# Patient Record
Sex: MALE | Race: WHITE | Employment: FULL TIME | URBAN - METROPOLITAN AREA
[De-identification: names, ages, dates, MRNs, and addresses within clinical notes are randomized per-mention and may not be internally consistent; named-entity substitution may affect disease eponyms.]

---

## 2017-02-08 ENCOUNTER — TELEPHONE (OUTPATIENT)
Dept: FAMILY MEDICINE CLINIC | Facility: CLINIC | Age: 57
End: 2017-02-08

## 2017-02-08 DIAGNOSIS — Z13.220 LIPID SCREENING: Primary | ICD-10-CM

## 2017-02-08 NOTE — TELEPHONE ENCOUNTER
Pt made physical appt and needs blood work order sent to [facility].

## 2017-02-15 ENCOUNTER — APPOINTMENT (OUTPATIENT)
Dept: LAB | Age: 57
End: 2017-02-15
Attending: FAMILY MEDICINE
Payer: COMMERCIAL

## 2017-02-15 DIAGNOSIS — Z13.220 LIPID SCREENING: ICD-10-CM

## 2017-02-15 LAB
ALBUMIN SERPL-MCNC: 4 G/DL (ref 3.5–4.8)
ALP LIVER SERPL-CCNC: 60 U/L (ref 45–117)
ALT SERPL-CCNC: 29 U/L (ref 17–63)
AST SERPL-CCNC: 12 U/L (ref 15–41)
BILIRUB SERPL-MCNC: 0.7 MG/DL (ref 0.1–2)
BUN BLD-MCNC: 19 MG/DL (ref 8–20)
CALCIUM BLD-MCNC: 9.1 MG/DL (ref 8.3–10.3)
CHLORIDE: 106 MMOL/L (ref 101–111)
CHOLEST SMN-MCNC: 211 MG/DL (ref ?–200)
CO2: 30 MMOL/L (ref 22–32)
CREAT BLD-MCNC: 1.04 MG/DL (ref 0.7–1.3)
GLUCOSE BLD-MCNC: 92 MG/DL (ref 70–99)
HDLC SERPL-MCNC: 59 MG/DL (ref 45–?)
HDLC SERPL: 3.58 {RATIO} (ref ?–4.97)
LDLC SERPL CALC-MCNC: 136 MG/DL (ref ?–130)
M PROTEIN MFR SERPL ELPH: 7.2 G/DL (ref 6.1–8.3)
NONHDLC SERPL-MCNC: 152 MG/DL (ref ?–130)
POTASSIUM SERPL-SCNC: 4 MMOL/L (ref 3.6–5.1)
SODIUM SERPL-SCNC: 141 MMOL/L (ref 136–144)
TRIGLYCERIDES: 80 MG/DL (ref ?–150)
VLDL: 16 MG/DL (ref 5–40)

## 2017-02-15 PROCEDURE — 80053 COMPREHEN METABOLIC PANEL: CPT

## 2017-02-15 PROCEDURE — 36415 COLL VENOUS BLD VENIPUNCTURE: CPT

## 2017-02-15 PROCEDURE — 80061 LIPID PANEL: CPT

## 2017-02-21 ENCOUNTER — OFFICE VISIT (OUTPATIENT)
Dept: FAMILY MEDICINE CLINIC | Facility: CLINIC | Age: 57
End: 2017-02-21

## 2017-02-21 VITALS
HEIGHT: 68 IN | RESPIRATION RATE: 12 BRPM | DIASTOLIC BLOOD PRESSURE: 70 MMHG | TEMPERATURE: 98 F | WEIGHT: 185 LBS | BODY MASS INDEX: 28.04 KG/M2 | SYSTOLIC BLOOD PRESSURE: 104 MMHG | HEART RATE: 68 BPM

## 2017-02-21 DIAGNOSIS — Z00.00 ANNUAL PHYSICAL EXAM: Primary | ICD-10-CM

## 2017-02-21 PROCEDURE — 99396 PREV VISIT EST AGE 40-64: CPT | Performed by: FAMILY MEDICINE

## 2017-02-21 NOTE — PROGRESS NOTES
Patient presents with:  Physical     HPI:   Messi Miranda is a 55year old male who presents for a complete physical exam. No specific concerns. Last colonoscopy:  1/25/2013. Due again 10 yrs. We have this record in scan.    Last PSA:  No famil Exercise: at [address] in the AMs. Elliptical or treadmill. , weights. 3-4 times a week. Diet: \"pretty good\". REVIEW OF SYSTEMS:     All systems reviewed, negative other than noted above.     EXAM:   /70 mmHg  Pulse 68  Temp(Src) 98.1 °

## 2020-02-27 LAB
AMB EXT CHOL/HDL RATIO: 3.9
AMB EXT CHOLESTEROL, TOTAL: 202 MG/DL
AMB EXT GLUCOSE: 100 MG/DL
AMB EXT HDL CHOLESTEROL: 52 MG/DL
AMB EXT LDL CHOLESTEROL, DIRECT: 134 MG/DL
AMB EXT TRIGLYCERIDES: 77 MG/DL

## 2020-06-24 DIAGNOSIS — Z78.9 PARTICIPANT IN HEALTH AND WELLNESS PLAN: Primary | ICD-10-CM

## 2020-07-07 DIAGNOSIS — Z78.9 PARTICIPANT IN HEALTH AND WELLNESS PLAN: Primary | ICD-10-CM

## 2020-07-14 ENCOUNTER — NURSE ONLY (OUTPATIENT)
Dept: LAB | Age: 60
End: 2020-07-14
Attending: PREVENTIVE MEDICINE

## 2020-07-14 DIAGNOSIS — Z78.9 PARTICIPANT IN HEALTH AND WELLNESS PLAN: ICD-10-CM

## 2020-07-14 LAB — SARS-COV-2 IGG SERPLBLD QL IA.RAPID: NEGATIVE

## 2020-07-14 PROCEDURE — 86769 SARS-COV-2 COVID-19 ANTIBODY: CPT

## 2020-07-23 ENCOUNTER — ORDER TRANSCRIPTION (OUTPATIENT)
Dept: PHYSICAL THERAPY | Age: 60
End: 2020-07-23

## 2020-07-23 DIAGNOSIS — S89.91XA INJURY OF RIGHT KNEE, INITIAL ENCOUNTER: ICD-10-CM

## 2020-07-23 DIAGNOSIS — S83.411A SPRAIN OF MEDIAL COLLATERAL LIGAMENT OF RIGHT KNEE: Primary | ICD-10-CM

## 2020-07-24 ENCOUNTER — TELEPHONE (OUTPATIENT)
Dept: FAMILY MEDICINE CLINIC | Facility: CLINIC | Age: 60
End: 2020-07-24

## 2020-07-24 DIAGNOSIS — Z00.00 LABORATORY EXAM ORDERED AS PART OF ROUTINE GENERAL MEDICAL EXAMINATION: Primary | ICD-10-CM

## 2020-07-24 NOTE — TELEPHONE ENCOUNTER
Please enter lab orders for the patient's upcoming physical appointment. Physical scheduled:    Your appointments     Date & Time Appointment Department [facility])    Sep 16, 2020  7:30 AM CDT Adult Physical with Alejandra Barcenas  [facility],

## 2020-07-28 ENCOUNTER — OFFICE VISIT (OUTPATIENT)
Dept: PHYSICAL THERAPY | Age: 60
End: 2020-07-28
Attending: ORTHOPAEDIC SURGERY
Payer: COMMERCIAL

## 2020-07-28 DIAGNOSIS — S83.411A SPRAIN OF MEDIAL COLLATERAL LIGAMENT OF RIGHT KNEE: ICD-10-CM

## 2020-07-28 DIAGNOSIS — S89.91XA INJURY OF RIGHT KNEE, INITIAL ENCOUNTER: ICD-10-CM

## 2020-07-28 PROCEDURE — 97161 PT EVAL LOW COMPLEX 20 MIN: CPT | Performed by: PHYSICAL THERAPIST

## 2020-07-28 PROCEDURE — 97110 THERAPEUTIC EXERCISES: CPT | Performed by: PHYSICAL THERAPIST

## 2020-07-28 NOTE — PROGRESS NOTES
KNEE EVALUATION:    Referring Physician: Erich Vigil MD    DX Code: Sprain of medial collateral ligament of right knee (I13.887G)  Injury of right knee, initial encounter (S89.91XA)     PT DX: Sprain of medial collateral ligament of right knee (S83.41 Handouts given  Pt.  Education: Activity modification: start HEP 2-6x/day  (Please close note by pressing F9 and then reopen by clicking on 'Edit'  before going further)   ASSESSMENT & PLAN OF CARE:     Jerre Krabbe is a 63year old male referred to weeks or a total of 8 visits.    Treatment will include:  · Manual therapy to address joint and/or soft tissue mobility  · Therapeutic exercises  · Stretching program  · Pt. education for posture, body mechanics, and ergonomics  · Balance and proprioceptive

## 2020-07-28 NOTE — PROGRESS NOTES
KNEE EVALUATION:    Referring Physician: Shawn Benítez MD    DX Code: Sprain of medial collateral ligament of right knee (U18.707W)  Injury of right knee, initial encounter (S89.91XA)     PT DX: Sprain of medial collateral ligament of right knee (S83.41 Handouts given  Pt.  Education: Activity modification: start HEP 2-6x/day  (Please close note by pressing F9 and then reopen by clicking on 'Edit'  before going further)   ASSESSMENT & PLAN OF CARE:     Barak Arzate is a 63year old male referred to weeks or a total of 8 visits.    Treatment will include:  · Manual therapy to address joint and/or soft tissue mobility  · Therapeutic exercises  · Stretching program  · Pt. education for posture, body mechanics, and ergonomics  · Balance and proprioceptive

## 2020-07-30 ENCOUNTER — OFFICE VISIT (OUTPATIENT)
Dept: PHYSICAL THERAPY | Age: 60
End: 2020-07-30
Attending: ORTHOPAEDIC SURGERY
Payer: COMMERCIAL

## 2020-07-30 PROCEDURE — 97110 THERAPEUTIC EXERCISES: CPT | Performed by: PHYSICAL THERAPIST

## 2020-07-30 NOTE — PROGRESS NOTES
Dx: Sprain of medial collateral ligament of right knee (H44.443G)  Injury of right knee, initial encounter (X33.17UD)         Authorized # of Visits:  8         Next MD visit: none scheduled  Fall Risk: standard         Precautions: n/a           Medicatio

## 2020-08-10 ENCOUNTER — APPOINTMENT (OUTPATIENT)
Dept: PHYSICAL THERAPY | Age: 60
End: 2020-08-10
Payer: COMMERCIAL

## 2020-08-11 ENCOUNTER — APPOINTMENT (OUTPATIENT)
Dept: PHYSICAL THERAPY | Age: 60
End: 2020-08-11
Attending: ORTHOPAEDIC SURGERY
Payer: COMMERCIAL

## 2020-08-11 ENCOUNTER — TELEPHONE (OUTPATIENT)
Dept: PHYSICAL THERAPY | Age: 60
End: 2020-08-11

## 2020-08-14 ENCOUNTER — OFFICE VISIT (OUTPATIENT)
Dept: PHYSICAL THERAPY | Age: 60
End: 2020-08-14
Attending: ORTHOPAEDIC SURGERY
Payer: COMMERCIAL

## 2020-08-14 PROCEDURE — 97110 THERAPEUTIC EXERCISES: CPT | Performed by: PHYSICAL THERAPIST

## 2020-08-14 NOTE — PROGRESS NOTES
Dx: Sprain of medial collateral ligament of right knee (P78.743T)  Injury of right knee, initial encounter (D11.05DT)         Authorized # of Visits:  8         Next MD visit: none scheduled  Fall Risk: standard         Precautions: n/a           Medicatio to cont with HEP and self coordination ex, return Monday for follow up. Possible discharge end of next week if doing well.     Skilled Services: TE, MT    Charges: TE3       Total Timed Treatment: 44 min  Total Treatment Time: 45 min

## 2020-08-17 ENCOUNTER — OFFICE VISIT (OUTPATIENT)
Dept: PHYSICAL THERAPY | Age: 60
End: 2020-08-17
Attending: ORTHOPAEDIC SURGERY
Payer: COMMERCIAL

## 2020-08-17 PROCEDURE — 97110 THERAPEUTIC EXERCISES: CPT | Performed by: PHYSICAL THERAPIST

## 2020-08-17 NOTE — PROGRESS NOTES
Dx: Sprain of medial collateral ligament of right knee (O99.781H)  Injury of right knee, initial encounter (W36.97DI)         Authorized # of Visits:  8         Next MD visit: none scheduled  Fall Risk: standard         Precautions: n/a           Medicatio Sartorius and left hip ext to 5/5 to allow pt to return to water skiing. · LEISURE:  Pt will be able to return to previous level of function for leisure activities  Pt. will be able to perform ADLs with no pain.     Plan: progress to full function, pt to c

## 2020-08-19 ENCOUNTER — OFFICE VISIT (OUTPATIENT)
Dept: PHYSICAL THERAPY | Age: 60
End: 2020-08-19
Attending: ORTHOPAEDIC SURGERY
Payer: COMMERCIAL

## 2020-08-19 PROCEDURE — 97110 THERAPEUTIC EXERCISES: CPT | Performed by: PHYSICAL THERAPIST

## 2020-08-19 NOTE — PROGRESS NOTES
DISCHARGE NOTE    Dx: Sprain of medial collateral ligament of right knee (O48.780V)  Injury of right knee, initial encounter (L81.54ML)         Authorized # of Visits:  8         Next MD visit: none scheduled  Fall Risk: standard         Precautions: n/a bird-dog x 10 ea 4 point bird-dog x 10 ea 4 point bird-dog x 10 ea          Assessment: progressing well. Unable to provoke any symptoms with full squats or descending stairs. Goals:     to be reached in 8 visits.   · Pt. will report decreased pain fro

## 2020-08-31 ENCOUNTER — APPOINTMENT (OUTPATIENT)
Dept: PHYSICAL THERAPY | Age: 60
End: 2020-08-31
Attending: ORTHOPAEDIC SURGERY
Payer: COMMERCIAL

## 2020-09-02 ENCOUNTER — APPOINTMENT (OUTPATIENT)
Dept: PHYSICAL THERAPY | Age: 60
End: 2020-09-02
Attending: ORTHOPAEDIC SURGERY

## 2020-09-16 ENCOUNTER — OFFICE VISIT (OUTPATIENT)
Dept: FAMILY MEDICINE CLINIC | Facility: CLINIC | Age: 60
End: 2020-09-16
Payer: COMMERCIAL

## 2020-09-16 VITALS
RESPIRATION RATE: 16 BRPM | SYSTOLIC BLOOD PRESSURE: 114 MMHG | HEART RATE: 72 BPM | DIASTOLIC BLOOD PRESSURE: 70 MMHG | BODY MASS INDEX: 28.85 KG/M2 | TEMPERATURE: 97 F | WEIGHT: 183.81 LBS | HEIGHT: 67 IN

## 2020-09-16 DIAGNOSIS — R23.9 SKIN CHANGE: ICD-10-CM

## 2020-09-16 DIAGNOSIS — Z00.00 ANNUAL PHYSICAL EXAM: Primary | ICD-10-CM

## 2020-09-16 PROCEDURE — 3008F BODY MASS INDEX DOCD: CPT | Performed by: FAMILY MEDICINE

## 2020-09-16 PROCEDURE — 3078F DIAST BP <80 MM HG: CPT | Performed by: FAMILY MEDICINE

## 2020-09-16 PROCEDURE — 3074F SYST BP LT 130 MM HG: CPT | Performed by: FAMILY MEDICINE

## 2020-09-16 PROCEDURE — 99386 PREV VISIT NEW AGE 40-64: CPT | Performed by: FAMILY MEDICINE

## 2020-09-16 NOTE — PROGRESS NOTES
Patient presents with: Well Adult: Annual physical     HPI:   Leonardo Bradshaw is a 62year old male who presents for a complete physical exam.  Feeling well overall. Last colonoscopy:  1/2013. Normal.  Repeat 2013.    Last PSA:   No issues, normal History    Tobacco Use      Smoking status: Never Smoker      Smokeless tobacco: Never Used    Alcohol use:  Yes      Alcohol/week: 0.0 standard drinks      Frequency: 2-4 times a month      Drinks per session: 1 or 2      Comment: occasional    Drug use: N

## 2020-09-18 ENCOUNTER — PATIENT MESSAGE (OUTPATIENT)
Dept: FAMILY MEDICINE CLINIC | Facility: CLINIC | Age: 60
End: 2020-09-18

## 2020-09-18 NOTE — TELEPHONE ENCOUNTER
From: Solitario Romero  To: Jordan Cameron MD  Sent: 9/18/2020 8:00 AM CDT  Subject: Test Results Question    Here is the blood work that was done in February. Is this what you need or do I need to go again?

## 2020-09-29 ENCOUNTER — LAB REQUISITION (OUTPATIENT)
Dept: LAB | Facility: HOSPITAL | Age: 60
End: 2020-09-29
Payer: COMMERCIAL

## 2020-09-29 DIAGNOSIS — D48.5 NEOPLASM OF UNCERTAIN BEHAVIOR OF SKIN: ICD-10-CM

## 2020-09-29 DIAGNOSIS — C44.91 BASAL CELL CARCINOMA OF SKIN, UNSPECIFIED: ICD-10-CM

## 2020-09-29 PROCEDURE — 88305 TISSUE EXAM BY PATHOLOGIST: CPT | Performed by: DERMATOLOGY

## 2020-12-19 ENCOUNTER — IMMUNIZATION (OUTPATIENT)
Dept: LAB | Facility: HOSPITAL | Age: 60
End: 2020-12-19
Attending: PREVENTIVE MEDICINE
Payer: COMMERCIAL

## 2020-12-19 DIAGNOSIS — Z23 NEED FOR VACCINATION: ICD-10-CM

## 2020-12-19 PROCEDURE — 0001A PFIZER-BIONTECH COVID-19 VACCINE: CPT

## 2020-12-21 ENCOUNTER — LAB ENCOUNTER (OUTPATIENT)
Dept: LAB | Facility: HOSPITAL | Age: 60
End: 2020-12-21
Attending: PREVENTIVE MEDICINE

## 2020-12-21 ENCOUNTER — TELEPHONE (OUTPATIENT)
Dept: INTERNAL MEDICINE CLINIC | Facility: HOSPITAL | Age: 60
End: 2020-12-21

## 2020-12-21 DIAGNOSIS — Z20.822 EXPOSURE TO COVID-19 VIRUS: ICD-10-CM

## 2020-12-21 DIAGNOSIS — Z20.822 EXPOSURE TO COVID-19 VIRUS: Primary | ICD-10-CM

## 2020-12-21 NOTE — TELEPHONE ENCOUNTER
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 [] Yes    [x] No   If yes, explain:       NOTES: Dr. Ozzie Meckel requests m2000 test today or tomorrow.   Gisel Murray has been coordinating [facility]

## 2020-12-22 NOTE — TELEPHONE ENCOUNTER
Results and RTW guidelines:    COVID RESULT GIVEN:      Test type:    [] Rapid         [x]       [x] NEGATIVE     Ordered  retest?  []Yes   [x] No (skip to RTW)          Notes:   Remains asymptomatic    RTW PLAN:    [] RTW 10 days with clearanc

## 2021-01-09 ENCOUNTER — IMMUNIZATION (OUTPATIENT)
Dept: LAB | Facility: HOSPITAL | Age: 61
End: 2021-01-09
Attending: PREVENTIVE MEDICINE
Payer: COMMERCIAL

## 2021-01-09 DIAGNOSIS — Z23 NEED FOR VACCINATION: ICD-10-CM

## 2021-01-09 PROCEDURE — 0002A SARSCOV2 VAC 30MCG/0.3ML IM: CPT

## 2021-07-14 ENCOUNTER — TELEPHONE (OUTPATIENT)
Dept: ORTHOPEDICS CLINIC | Facility: CLINIC | Age: 61
End: 2021-07-14

## 2021-07-14 DIAGNOSIS — M25.562 LEFT KNEE PAIN, UNSPECIFIED CHRONICITY: Primary | ICD-10-CM

## 2021-07-14 NOTE — TELEPHONE ENCOUNTER
Attempted to call Julissa Bennett regarding xray orders. Reached voicemail, left voicemail and provided a detail message with my call back contact information.

## 2021-07-14 NOTE — TELEPHONE ENCOUNTER
Patient has an appointment scheduled on 7/15 for left knee injury. Will patient need imaging prior to appointment?

## 2021-07-15 ENCOUNTER — HOSPITAL ENCOUNTER (OUTPATIENT)
Dept: GENERAL RADIOLOGY | Age: 61
Discharge: HOME | End: 2021-07-15
Attending: NURSE PRACTITIONER
Payer: COMMERCIAL

## 2021-07-15 ENCOUNTER — OFFICE VISIT (OUTPATIENT)
Dept: ORTHOPEDICS CLINIC | Facility: CLINIC | Age: 61
End: 2021-07-15
Payer: COMMERCIAL

## 2021-07-15 DIAGNOSIS — Z01.89 ENCOUNTER FOR LOWER EXTREMITY COMPARISON IMAGING STUDY: ICD-10-CM

## 2021-07-15 DIAGNOSIS — M22.42 CHONDROMALACIA, PATELLA, LEFT: Primary | ICD-10-CM

## 2021-07-15 DIAGNOSIS — M25.562 LEFT KNEE PAIN, UNSPECIFIED CHRONICITY: ICD-10-CM

## 2021-07-15 DIAGNOSIS — M25.562 ACUTE PAIN OF LEFT KNEE: ICD-10-CM

## 2021-07-15 PROCEDURE — 73564 X-RAY EXAM KNEE 4 OR MORE: CPT | Performed by: NURSE PRACTITIONER

## 2021-07-15 PROCEDURE — 99203 OFFICE O/P NEW LOW 30 MIN: CPT | Performed by: NURSE PRACTITIONER

## 2021-07-15 PROCEDURE — 73562 X-RAY EXAM OF KNEE 3: CPT | Performed by: NURSE PRACTITIONER

## 2021-07-15 RX ORDER — DICLOFENAC SODIUM 75 MG/1
75 TABLET, DELAYED RELEASE ORAL 2 TIMES DAILY
Qty: 60 TABLET | Refills: 1 | Status: SHIPPED | OUTPATIENT
Start: 2021-07-15 | End: 2021-08-09

## 2021-07-15 NOTE — PROGRESS NOTES
[facility] New Patient Note    CC: Patient presents with:  Knee Pain: left knee, onset: 07.04.21    HPI: This 62year old male presents today with complaints of acute onset left knee pain after spending a week at his summer home over the Fourth of [place] compartment but no true medial lateral joint line pain and a negative Jeremy's. He has no laxity valgus varus stress and a negative Lachman's. He is neurovascular intact distally.     IMAGING  Radiographs were personally reviewed that were negative for

## 2021-07-19 ENCOUNTER — TELEPHONE (OUTPATIENT)
Dept: FAMILY MEDICINE CLINIC | Facility: CLINIC | Age: 61
End: 2021-07-19

## 2021-07-19 DIAGNOSIS — Z12.5 PROSTATE CANCER SCREENING: ICD-10-CM

## 2021-07-19 DIAGNOSIS — Z13.220 LIPID SCREENING: Primary | ICD-10-CM

## 2021-07-19 NOTE — TELEPHONE ENCOUNTER
Please enter lab orders for the patient's upcoming physical appointment. Physical scheduled:    Your appointments     Date & Time Appointment Department [facility])    Sep 17, 2021  8:00 AM CDT Adult Physical with MD [facility] [employer],

## 2021-07-26 ENCOUNTER — TELEPHONE (OUTPATIENT)
Dept: ORTHOPEDICS CLINIC | Facility: CLINIC | Age: 61
End: 2021-07-26

## 2021-07-26 NOTE — TELEPHONE ENCOUNTER
Spoke to patient who states that he doesn't think that an injection is the best answer for his treatment, states that on Friday he heard a loud pop and could not bear weight on it.  He was out of town and went to [facility] and they did xrays which showed the same

## 2021-07-26 NOTE — TELEPHONE ENCOUNTER
Patient saw Christa Ames & had xrays on 7/15/21 for left knee pain. He had xrays again this weekend while he was out of town because the pain worsened. There were no changes in the xrays.  He was to begin PT this week but wants to know what his next steps are rega

## 2021-07-27 ENCOUNTER — TELEPHONE (OUTPATIENT)
Dept: PHYSICAL THERAPY | Facility: HOSPITAL | Age: 61
End: 2021-07-27

## 2021-07-27 ENCOUNTER — TELEPHONE (OUTPATIENT)
Dept: ORTHOPEDICS CLINIC | Facility: CLINIC | Age: 61
End: 2021-07-27

## 2021-07-27 DIAGNOSIS — M25.562 ACUTE PAIN OF LEFT KNEE: Primary | ICD-10-CM

## 2021-07-27 NOTE — TELEPHONE ENCOUNTER
Patient called again today asking if he could get an MRI order for his left knee that he re-injured over the weekend. He states his knee gives out and it has no support. He would like an MRI to see if there is a tear.

## 2021-07-27 NOTE — TELEPHONE ENCOUNTER
Mara Senior  7/27/21 11:33 AM  Patient called again today asking if he could get an MRI order for his left knee that he re-injured over the weekend. He states his knee gives out and it has no support. He would like an MRI to see if there is a tear.

## 2021-07-28 ENCOUNTER — OFFICE VISIT (OUTPATIENT)
Dept: PHYSICAL THERAPY | Facility: HOSPITAL | Age: 61
End: 2021-07-28
Attending: NURSE PRACTITIONER
Payer: COMMERCIAL

## 2021-07-28 DIAGNOSIS — M25.562 ACUTE PAIN OF LEFT KNEE: ICD-10-CM

## 2021-07-28 DIAGNOSIS — M22.42 CHONDROMALACIA, PATELLA, LEFT: ICD-10-CM

## 2021-07-28 PROCEDURE — 97110 THERAPEUTIC EXERCISES: CPT

## 2021-07-28 PROCEDURE — 97161 PT EVAL LOW COMPLEX 20 MIN: CPT

## 2021-07-28 NOTE — PROGRESS NOTES
LOWER EXTREMITY EVALUATION:   Referring Physician: Dr. 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  Past medical history was reviewed with Laura Negron. Significant findings include herniation of intervertebral disc between L4 and L5, with myelopathy (7/8/2013). He also mentions a history of R knee meniscus tear that improved with PT no surgery needed. pain anterior and posterior knee (Ely's)  Gastroc-soleus: R decreased; L decreased    Strength/MMT: (* denotes performed with pain)  Hip Knee   Flexion: R 5/5; L 4+/5  Extension: R 4+/5; L 4/5  ER: R 5/5; L 4+/5  IR: R 5/5; L 4+/5 Flexion: R 5/5; L 4/5  Ex knee AROM flexion to >140 degrees to improve ability to perform recreational activities including hiking and skiing   · Pt will improve quad strength to 5/5 to ascend 1 flight of stairs reciprocally without UE assist   · Pt will be independent and complian

## 2021-07-28 NOTE — TELEPHONE ENCOUNTER
Patient informed of MD recommendations below and MRI approval procedure, patient verbalized understanding with intent to comply. Offered opportunity to ask questions, all questions were answered.

## 2021-08-02 ENCOUNTER — HOSPITAL ENCOUNTER (OUTPATIENT)
Dept: MRI IMAGING | Facility: HOSPITAL | Age: 61
Discharge: HOME | End: 2021-08-02
Attending: ORTHOPAEDIC SURGERY
Payer: COMMERCIAL

## 2021-08-02 ENCOUNTER — TELEPHONE (OUTPATIENT)
Dept: ORTHOPEDICS CLINIC | Facility: CLINIC | Age: 61
End: 2021-08-02

## 2021-08-02 DIAGNOSIS — M25.562 ACUTE PAIN OF LEFT KNEE: ICD-10-CM

## 2021-08-02 PROCEDURE — 73721 MRI JNT OF LWR EXTRE W/O DYE: CPT | Performed by: ORTHOPAEDIC SURGERY

## 2021-08-02 NOTE — TELEPHONE ENCOUNTER
Pt called and notified the results for his MRI have not been finalized in his chart yet. Pt notified, once they are, they should be able to be viewed in his account. Pt notified to follow up in clinic for his MRI results. Pt verbalized understanding.  No fu

## 2021-08-05 ENCOUNTER — TELEPHONE (OUTPATIENT)
Dept: ORTHOPEDICS CLINIC | Facility: CLINIC | Age: 61
End: 2021-08-05

## 2021-08-05 ENCOUNTER — OFFICE VISIT (OUTPATIENT)
Dept: ORTHOPEDICS CLINIC | Facility: CLINIC | Age: 61
End: 2021-08-05
Payer: COMMERCIAL

## 2021-08-05 VITALS — HEIGHT: 68.5 IN | OXYGEN SATURATION: 99 % | BODY MASS INDEX: 27.3 KG/M2 | WEIGHT: 182.19 LBS | HEART RATE: 66 BPM

## 2021-08-05 DIAGNOSIS — M95.8 OSTEOCHONDRAL DEFECT: Primary | ICD-10-CM

## 2021-08-05 PROCEDURE — 99215 OFFICE O/P EST HI 40 MIN: CPT | Performed by: ORTHOPAEDIC SURGERY

## 2021-08-05 PROCEDURE — 3008F BODY MASS INDEX DOCD: CPT | Performed by: ORTHOPAEDIC SURGERY

## 2021-08-05 NOTE — TELEPHONE ENCOUNTER
Surgeon: Dr. Blu Ocampo     Date of Surgery: 8/10/21     Post Op Appt: 8/18/21     Facility: [facility]     IP vs OP: OP     Surgical Assistant Obtained: Chriss PRATER REQUESTED      Preadmission Testing Ordered: N/A      Pre-Op Clearance Requested:   PCP: N/A    Ca

## 2021-08-05 NOTE — PROGRESS NOTES
OR BOOKING SHEET KNEE ARTHROSCOPY  Name: Darrick Tomlinson  MRN: [id number]   : 1960  Diagnosis:  [x] Osteochondral defect [M95.8]  Disposition:    [x] Ambulatory  Operative Time Required: 1 hour  Procedure:  Antibiotics: 2 g cefazolin withi

## 2021-08-05 NOTE — H&P (VIEW-ONLY)
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  Jonathon urias neurovascular exam demonstrates normal perfusion, intact sensation to light touch and full strength. Examination of the contralateral knee demonstrates:  No significant atrophy, swelling or effusion. Full range of motion.  Neurovascularly intact distall proton density with and without fat saturation images were obtained. PATIENT STATED HISTORY: (As transcribed by Technologist)     FINDINGS:  LIGAMENTS:          The ACL, PCL, patellar retinacula, and collateral ligament complexes are intact.  MENISCI: displaced flap of articular cartilage within the intercondylar notch that measures approximately 1.7 x 1.2 cm in greatest AP and transverse dimensions respectively.  3. Mild joint effusion with small Baker's cyst and associated fluid distension of the popli the knee.      I had a lengthy discussion with Yaa Henning about the diagnosis and options, both surgical and nonsurgical. I have recommended that we proceed with surgical treatment as we agree surgical intervention would likely offer the best opportunity for s may still occur.

## 2021-08-05 NOTE — TELEPHONE ENCOUNTER
OR BOOKING SHEET KNEE ARTHROSCOPY  Name: Dakotah Adan  MRN: [id number]   : 1960  Diagnosis:  [x]? Osteochondral defect [M95.8]  Disposition:    [x]?  Ambulatory  Operative Time Required: 1 hour  Procedure:  Antibiotics: 2 g cefazolin withi

## 2021-08-05 NOTE — TELEPHONE ENCOUNTER
Shar Romero's case has been scheduled/rescheduled at 1125 on 8/10/2021 at [facility]  Received:  Daniella Artis RMA  Patient Name: Viral Hashimoto    MRN: [id number]     RIGHT KNEE Jacquelineestebanelo Lopez

## 2021-08-05 NOTE — H&P
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  Jonathon urias neurovascular exam demonstrates normal perfusion, intact sensation to light touch and full strength. Examination of the contralateral knee demonstrates:  No significant atrophy, swelling or effusion. Full range of motion.  Neurovascularly intact distall proton density with and without fat saturation images were obtained. PATIENT STATED HISTORY: (As transcribed by Technologist)     FINDINGS:  LIGAMENTS:          The ACL, PCL, patellar retinacula, and collateral ligament complexes are intact.  MENISCI: displaced flap of articular cartilage within the intercondylar notch that measures approximately 1.7 x 1.2 cm in greatest AP and transverse dimensions respectively.  3. Mild joint effusion with small Baker's cyst and associated fluid distension of the popli the knee.      I had a lengthy discussion with Chioma Stephens about the diagnosis and options, both surgical and nonsurgical. I have recommended that we proceed with surgical treatment as we agree surgical intervention would likely offer the best opportunity for s may still occur.

## 2021-08-05 NOTE — PROGRESS NOTES
[facility] Progress Note    Subjective: Previous patient of Silas Ramirez, here today to review MRI results. Patient reports that he had the onset of pain in his knee about 2-1/2 months ago without any inciting traumatic events.   He reports that he

## 2021-08-06 RX ORDER — ACETAMINOPHEN 500 MG
1000 TABLET ORAL ONCE
Status: CANCELLED | OUTPATIENT
Start: 2021-08-06 | End: 2021-08-06

## 2021-08-09 ENCOUNTER — ANESTHESIA EVENT (OUTPATIENT)
Dept: SURGERY | Facility: HOSPITAL | Age: 61
End: 2021-08-09
Payer: COMMERCIAL

## 2021-08-09 RX ORDER — TRAMADOL HYDROCHLORIDE 50 MG/1
TABLET ORAL
Qty: 20 TABLET | Refills: 1 | Status: SHIPPED | OUTPATIENT
Start: 2021-08-09 | End: 2021-09-17

## 2021-08-09 RX ORDER — ONDANSETRON 4 MG/1
4 TABLET, ORALLY DISINTEGRATING ORAL EVERY 8 HOURS PRN
Qty: 8 TABLET | Refills: 0 | Status: SHIPPED | OUTPATIENT
Start: 2021-08-09 | End: 2021-09-17

## 2021-08-09 RX ORDER — MELOXICAM 15 MG/1
15 TABLET ORAL DAILY
Qty: 14 TABLET | Refills: 1 | Status: SHIPPED | OUTPATIENT
Start: 2021-08-09 | End: 2021-09-17

## 2021-08-09 NOTE — ANESTHESIA PREPROCEDURE EVALUATION
PRE-OP EVALUATION    Patient Name: [facility]    Admit Diagnosis: Osteochondral defect [M95.8]    Pre-op Diagnosis: Osteochondral defect [M95.8]    LEFT KNEE ARTHROSCOPY, CHONDROPLASTY, REMOVAL OF LOOSE BODY, STEM CELLS FROM BONE MARROW ASPIRAT Alcohol use: Yes      Alcohol/week: 4.0 standard drinks      Types: 2 Cans of beer, 2 Shots of liquor per week      Comment: occasional      Drug use: No     Available pre-op labs reviewed.                Airway      Mallampati: II  Mouth opening: >3 FB  TM

## 2021-08-10 ENCOUNTER — TELEPHONE (OUTPATIENT)
Dept: PHYSICAL THERAPY | Facility: HOSPITAL | Age: 61
End: 2021-08-10

## 2021-08-10 ENCOUNTER — HOSPITAL ENCOUNTER (OUTPATIENT)
Facility: HOSPITAL | Age: 61
Setting detail: HOSPITAL OUTPATIENT SURGERY
Discharge: HOME | End: 2021-08-10
Attending: ORTHOPAEDIC SURGERY | Admitting: ORTHOPAEDIC SURGERY
Payer: COMMERCIAL

## 2021-08-10 ENCOUNTER — ANESTHESIA (OUTPATIENT)
Dept: SURGERY | Facility: HOSPITAL | Age: 61
End: 2021-08-10
Payer: COMMERCIAL

## 2021-08-10 VITALS
RESPIRATION RATE: 16 BRPM | SYSTOLIC BLOOD PRESSURE: 134 MMHG | TEMPERATURE: 98 F | BODY MASS INDEX: 27.08 KG/M2 | DIASTOLIC BLOOD PRESSURE: 80 MMHG | HEART RATE: 75 BPM | WEIGHT: 180.75 LBS | OXYGEN SATURATION: 98 % | HEIGHT: 68.5 IN

## 2021-08-10 DIAGNOSIS — M94.9 OSTEOCHONDRAL LESION: Primary | ICD-10-CM

## 2021-08-10 DIAGNOSIS — M95.8 OSTEOCHONDRAL DEFECT: ICD-10-CM

## 2021-08-10 DIAGNOSIS — M89.9 OSTEOCHONDRAL LESION: Primary | ICD-10-CM

## 2021-08-10 PROCEDURE — 0SBD4ZX EXCISION OF LEFT KNEE JOINT, PERCUTANEOUS ENDOSCOPIC APPROACH, DIAGNOSTIC: ICD-10-PCS | Performed by: ORTHOPAEDIC SURGERY

## 2021-08-10 PROCEDURE — 3E0U3GC INTRODUCTION OF OTHER THERAPEUTIC SUBSTANCE INTO JOINTS, PERCUTANEOUS APPROACH: ICD-10-PCS | Performed by: ORTHOPAEDIC SURGERY

## 2021-08-10 PROCEDURE — 0SBD4ZZ EXCISION OF LEFT KNEE JOINT, PERCUTANEOUS ENDOSCOPIC APPROACH: ICD-10-PCS | Performed by: ORTHOPAEDIC SURGERY

## 2021-08-10 PROCEDURE — 0SCD4ZZ EXTIRPATION OF MATTER FROM LEFT KNEE JOINT, PERCUTANEOUS ENDOSCOPIC APPROACH: ICD-10-PCS | Performed by: ORTHOPAEDIC SURGERY

## 2021-08-10 RX ORDER — KETOROLAC TROMETHAMINE 30 MG/ML
INJECTION, SOLUTION INTRAMUSCULAR; INTRAVENOUS AS NEEDED
Status: DISCONTINUED | OUTPATIENT
Start: 2021-08-10 | End: 2021-08-10 | Stop reason: SURG

## 2021-08-10 RX ORDER — DIPHENHYDRAMINE HYDROCHLORIDE 50 MG/ML
12.5 INJECTION INTRAMUSCULAR; INTRAVENOUS AS NEEDED
Status: DISCONTINUED | OUTPATIENT
Start: 2021-08-10 | End: 2021-08-10

## 2021-08-10 RX ORDER — METOCLOPRAMIDE HYDROCHLORIDE 5 MG/ML
10 INJECTION INTRAMUSCULAR; INTRAVENOUS AS NEEDED
Status: DISCONTINUED | OUTPATIENT
Start: 2021-08-10 | End: 2021-08-10

## 2021-08-10 RX ORDER — LIDOCAINE HYDROCHLORIDE 10 MG/ML
INJECTION, SOLUTION EPIDURAL; INFILTRATION; INTRACAUDAL; PERINEURAL AS NEEDED
Status: DISCONTINUED | OUTPATIENT
Start: 2021-08-10 | End: 2021-08-10 | Stop reason: SURG

## 2021-08-10 RX ORDER — ONDANSETRON 2 MG/ML
4 INJECTION INTRAMUSCULAR; INTRAVENOUS AS NEEDED
Status: DISCONTINUED | OUTPATIENT
Start: 2021-08-10 | End: 2021-08-10

## 2021-08-10 RX ORDER — EPHEDRINE SULFATE 50 MG/ML
INJECTION INTRAVENOUS AS NEEDED
Status: DISCONTINUED | OUTPATIENT
Start: 2021-08-10 | End: 2021-08-10 | Stop reason: SURG

## 2021-08-10 RX ORDER — CEFAZOLIN SODIUM/WATER 2 G/20 ML
2 SYRINGE (ML) INTRAVENOUS ONCE
Status: COMPLETED | OUTPATIENT
Start: 2021-08-10 | End: 2021-08-10

## 2021-08-10 RX ORDER — HYDROCODONE BITARTRATE AND ACETAMINOPHEN 5; 325 MG/1; MG/1
1 TABLET ORAL AS NEEDED
Status: DISCONTINUED | OUTPATIENT
Start: 2021-08-10 | End: 2021-08-10

## 2021-08-10 RX ORDER — NALOXONE HYDROCHLORIDE 0.4 MG/ML
80 INJECTION, SOLUTION INTRAMUSCULAR; INTRAVENOUS; SUBCUTANEOUS AS NEEDED
Status: DISCONTINUED | OUTPATIENT
Start: 2021-08-10 | End: 2021-08-10

## 2021-08-10 RX ORDER — MIDAZOLAM HYDROCHLORIDE 1 MG/ML
1 INJECTION INTRAMUSCULAR; INTRAVENOUS EVERY 5 MIN PRN
Status: DISCONTINUED | OUTPATIENT
Start: 2021-08-10 | End: 2021-08-10

## 2021-08-10 RX ORDER — ONDANSETRON 2 MG/ML
INJECTION INTRAMUSCULAR; INTRAVENOUS AS NEEDED
Status: DISCONTINUED | OUTPATIENT
Start: 2021-08-10 | End: 2021-08-10 | Stop reason: SURG

## 2021-08-10 RX ORDER — HYDROCODONE BITARTRATE AND ACETAMINOPHEN 5; 325 MG/1; MG/1
2 TABLET ORAL AS NEEDED
Status: DISCONTINUED | OUTPATIENT
Start: 2021-08-10 | End: 2021-08-10

## 2021-08-10 RX ORDER — DEXAMETHASONE SODIUM PHOSPHATE 4 MG/ML
VIAL (ML) INJECTION AS NEEDED
Status: DISCONTINUED | OUTPATIENT
Start: 2021-08-10 | End: 2021-08-10 | Stop reason: SURG

## 2021-08-10 RX ORDER — SODIUM CHLORIDE, SODIUM LACTATE, POTASSIUM CHLORIDE, CALCIUM CHLORIDE 600; 310; 30; 20 MG/100ML; MG/100ML; MG/100ML; MG/100ML
INJECTION, SOLUTION INTRAVENOUS CONTINUOUS
Status: DISCONTINUED | OUTPATIENT
Start: 2021-08-10 | End: 2021-08-10

## 2021-08-10 RX ORDER — HYDROMORPHONE HYDROCHLORIDE 1 MG/ML
0.4 INJECTION, SOLUTION INTRAMUSCULAR; INTRAVENOUS; SUBCUTANEOUS EVERY 5 MIN PRN
Status: DISCONTINUED | OUTPATIENT
Start: 2021-08-10 | End: 2021-08-10

## 2021-08-10 RX ORDER — ACETAMINOPHEN 500 MG
1000 TABLET ORAL EVERY 6 HOURS PRN
COMMUNITY

## 2021-08-10 RX ORDER — MEPERIDINE HYDROCHLORIDE 25 MG/ML
12.5 INJECTION INTRAMUSCULAR; INTRAVENOUS; SUBCUTANEOUS AS NEEDED
Status: DISCONTINUED | OUTPATIENT
Start: 2021-08-10 | End: 2021-08-10

## 2021-08-10 RX ORDER — MIDAZOLAM HYDROCHLORIDE 1 MG/ML
INJECTION INTRAMUSCULAR; INTRAVENOUS AS NEEDED
Status: DISCONTINUED | OUTPATIENT
Start: 2021-08-10 | End: 2021-08-10 | Stop reason: SURG

## 2021-08-10 RX ORDER — BUPIVACAINE HYDROCHLORIDE AND EPINEPHRINE 5; 5 MG/ML; UG/ML
INJECTION, SOLUTION EPIDURAL; INTRACAUDAL; PERINEURAL AS NEEDED
Status: DISCONTINUED | OUTPATIENT
Start: 2021-08-10 | End: 2021-08-10 | Stop reason: HOSPADM

## 2021-08-10 RX ADMIN — EPHEDRINE SULFATE 5 MG: 50 INJECTION INTRAVENOUS at 09:52:00

## 2021-08-10 RX ADMIN — SODIUM CHLORIDE, SODIUM LACTATE, POTASSIUM CHLORIDE, CALCIUM CHLORIDE: 600; 310; 30; 20 INJECTION, SOLUTION INTRAVENOUS at 09:40:00

## 2021-08-10 RX ADMIN — LIDOCAINE HYDROCHLORIDE 50 MG: 10 INJECTION, SOLUTION EPIDURAL; INFILTRATION; INTRACAUDAL; PERINEURAL at 09:43:00

## 2021-08-10 RX ADMIN — SODIUM CHLORIDE, SODIUM LACTATE, POTASSIUM CHLORIDE, CALCIUM CHLORIDE: 600; 310; 30; 20 INJECTION, SOLUTION INTRAVENOUS at 11:03:00

## 2021-08-10 RX ADMIN — DEXAMETHASONE SODIUM PHOSPHATE 8 MG: 4 MG/ML VIAL (ML) INJECTION at 09:52:00

## 2021-08-10 RX ADMIN — MIDAZOLAM HYDROCHLORIDE 2 MG: 1 INJECTION INTRAMUSCULAR; INTRAVENOUS at 09:42:00

## 2021-08-10 RX ADMIN — KETOROLAC TROMETHAMINE 30 MG: 30 INJECTION, SOLUTION INTRAMUSCULAR; INTRAVENOUS at 10:46:00

## 2021-08-10 RX ADMIN — ONDANSETRON 4 MG: 2 INJECTION INTRAMUSCULAR; INTRAVENOUS at 10:46:00

## 2021-08-10 RX ADMIN — CEFAZOLIN SODIUM/WATER 2 G: 2 G/20 ML SYRINGE (ML) INTRAVENOUS at 09:50:00

## 2021-08-10 NOTE — ANESTHESIA PROCEDURE NOTES
Airway  Date/Time: 8/10/2021 9:45 AM  Urgency: elective    Airway not difficult    General Information and Staff    Patient location during procedure: OR  Anesthesiologist: Yenni Gonzalez MD  Resident/CRNA: Richie Mayorga CRNA  Performed: CRNA     Indic

## 2021-08-10 NOTE — ANESTHESIA POSTPROCEDURE EVALUATION
[address] [facility] Patient Status:  Hospital Outpatient Surgery   Age/Gender 62year old male MRN [id number]   [facility] SURGERY Attending Sarah Lee, [address] [facility] [address] Day # 0 PCP Marlin Portillo MD       Anesthesia

## 2021-08-10 NOTE — BRIEF OP NOTE
Pre-Operative Diagnosis: Osteochondral defect [M95.8]     Post-Operative Diagnosis: Osteochondral defect [M95.8]      Procedure Performed:   LEFT KNEE ARTHROSCOPY, CHONDROPLASTY, REMOVAL OF LOOSE BODY, PARTIAL MEDIAL MENISECTOMY; STEM CELLS FROM [facility]

## 2021-08-11 ENCOUNTER — TELEPHONE (OUTPATIENT)
Dept: PHYSICAL THERAPY | Facility: HOSPITAL | Age: 61
End: 2021-08-11

## 2021-08-11 ENCOUNTER — OFFICE VISIT (OUTPATIENT)
Dept: PHYSICAL THERAPY | Facility: HOSPITAL | Age: 61
End: 2021-08-11
Attending: ORTHOPAEDIC SURGERY
Payer: COMMERCIAL

## 2021-08-11 DIAGNOSIS — M94.9 OSTEOCHONDRAL LESION: ICD-10-CM

## 2021-08-11 DIAGNOSIS — M89.9 OSTEOCHONDRAL LESION: ICD-10-CM

## 2021-08-11 PROCEDURE — 97162 PT EVAL MOD COMPLEX 30 MIN: CPT

## 2021-08-11 NOTE — PROGRESS NOTES
POST-OP KNEE EVALUATION:   Referring Physician: Dr. Jake uDbose  Diagnosis: Osteochondral lesion (M89.9,M94.9)     Date of Service: 8/11/2021     PATIENT SUMMARY   Dwight Marks is a 62year old male who presents to therapy today s/p left knee arthro Significant findings include herniation of intervertebral disc between L4 and L5, with myelopathy    ASSESSMENT  Ino Jo presents to physical therapy evaluation with primary c/o impaired knee flexibility and impaired strength as after knee arthroscopic brianna on exam findings, treatment diagnosis, treatment plan, expectations, and prognosis. Pt was also provided recommendations for activity modifications, possible soreness after evaluation and importance of remaining active.  Patient was instructed in and issued will include: Gait training, Manual Therapy, Neuromuscular Re-education, Therapeutic Activities, Therapeutic Exercise and Home Exercise Program instruction    Education or treatment limitation: None  Rehab Potential:good    FOTO: 57/100    Patient/Family/C

## 2021-08-12 NOTE — OPERATIVE REPORT
[facility] OPERATIVE RECORD  ATTENDING SURGEON: Gallo Oh MD  ASSISTANT(S): Xuan Jones [place], [place] [facility]  PRELIMINARY DIAGNOSIS:  1. Left knee osteochondral lesion. POSTOPERATIVE DIAGNOSIS:      1.     Left knee o resurfacing which could entail Denovo articulated cartilage, versus osteochondral allograft. Given that he is largely asymptomatic.   I would err on the side of conservative management with removal of loose body and possible bone marrow aspirate concentrat performed with standard anterolateral visualization portal was made utilizing a 15 blade, and an arthroscope was introduced. The medial working portal was established under spinal needle localization and diagnostic arthroscopy was commenced.       Mariposa Mott compartment after closure of the wounds. 4 x 4 and Tegaderm, Ottoniel style dressing was applied. The knee was wrapped in a 6 inch Ace wrap. The final count prior to closure was correct. The patient tolerated the procedure well without complications.

## 2021-08-13 ENCOUNTER — APPOINTMENT (OUTPATIENT)
Dept: PHYSICAL THERAPY | Facility: HOSPITAL | Age: 61
End: 2021-08-13
Attending: ORTHOPAEDIC SURGERY
Payer: COMMERCIAL

## 2021-08-16 ENCOUNTER — OFFICE VISIT (OUTPATIENT)
Dept: PHYSICAL THERAPY | Facility: HOSPITAL | Age: 61
End: 2021-08-16
Attending: ORTHOPAEDIC SURGERY
Payer: COMMERCIAL

## 2021-08-16 PROCEDURE — 97110 THERAPEUTIC EXERCISES: CPT

## 2021-08-16 NOTE — PROGRESS NOTES
Dx: Osteochondral lesion (M89.9,M94.9)         Insurance (Authorized # of Visits):  POC for 10, 20 visits/year          Authorizing Physician: Dr. Ирина Jurado MD visit: none scheduled  Fall Risk: standard         Precautions: n/a             Subjective:  Angela Salinas TX#: 5/ Date:    Tx#: 6/   There ex  LAQ 2# weight 2x12  Shuttle SLP 2x10 ea 25#  Mini squat 2x10  Forward step up/down 4\" box, 6\" box 8x with L LE  Calf stretching slant board 2x30 sec  HS stretching strap 2x30 sec ea       Man there  Post femo

## 2021-08-18 ENCOUNTER — OFFICE VISIT (OUTPATIENT)
Dept: PHYSICAL THERAPY | Facility: HOSPITAL | Age: 61
End: 2021-08-18
Attending: ORTHOPAEDIC SURGERY
Payer: COMMERCIAL

## 2021-08-18 ENCOUNTER — OFFICE VISIT (OUTPATIENT)
Dept: ORTHOPEDICS CLINIC | Facility: CLINIC | Age: 61
End: 2021-08-18
Payer: COMMERCIAL

## 2021-08-18 DIAGNOSIS — M95.8 OSTEOCHONDRAL DEFECT: Primary | ICD-10-CM

## 2021-08-18 PROCEDURE — 97110 THERAPEUTIC EXERCISES: CPT

## 2021-08-18 PROCEDURE — 99024 POSTOP FOLLOW-UP VISIT: CPT | Performed by: ORTHOPAEDIC SURGERY

## 2021-08-18 NOTE — PROGRESS NOTES
Dx: L Osteochondral lesion (M89.9,M94.9)         Insurance (Authorized # of Visits):  POC for 10, 20 visits/year          Authorizing Physician: Dr. Keyon Pagan  Next MD visit: none scheduled  Fall Risk: standard         Precautions: n/a             Subjective: ball 2x12  LAQ 3# 12x, 4# 2x12  Forward step up 8\" box with terminal knee ext 5 sec hold, 2x10  Shuttle SLP 31# 2x10 on L  Monster band walk green tube band 2x  Lateral band walk green tube band 2x  HS stretch strap 3x30 sec on L      Man there  Post femo

## 2021-08-18 NOTE — PROGRESS NOTES
[facility] - ORTHOPEDICS  [address]     Name: Magdaleno Kebede   MRN: [id number]  Date: 8/18/2021     REASON FOR VISIT: First Post-Surgical Visit  Date of Surgery: 8/10/20 surgical incision with overlying Steri-Strips and Dermabond. Dressing from the left hip also removed without any evidence of abnormality or concern. No obvious peripheral edema noted.    Distal neurovascular exam demonstrates normal perfusion, intact se

## 2021-08-26 ENCOUNTER — OFFICE VISIT (OUTPATIENT)
Dept: PHYSICAL THERAPY | Facility: HOSPITAL | Age: 61
End: 2021-08-26
Attending: ORTHOPAEDIC SURGERY
Payer: COMMERCIAL

## 2021-08-26 PROCEDURE — 97110 THERAPEUTIC EXERCISES: CPT

## 2021-08-26 NOTE — PROGRESS NOTES
Dx: L Osteochondral lesion (M89.9,M94.9)         Insurance (Authorized # of Visits):  POC for 10, 20 visits/year          Authorizing Physician: Dr. Jaquelin Olmos  Next MD visit: none scheduled  Fall Risk: standard         Precautions: n/a             Subjective: 2x12  Forward step up 8\" box with terminal knee ext 5 sec hold, 2x10  Shuttle SLP 31# 2x10 on L  Monster band walk green tube band 2x  Lateral band walk green tube band 2x  HS stretch strap 3x30 sec on L There ex  Bike 4 min  Prone hang 2 min  Bridge nika

## 2021-09-08 ENCOUNTER — APPOINTMENT (OUTPATIENT)
Dept: PHYSICAL THERAPY | Facility: HOSPITAL | Age: 61
End: 2021-09-08
Payer: COMMERCIAL

## 2021-09-08 ENCOUNTER — OFFICE VISIT (OUTPATIENT)
Dept: PHYSICAL THERAPY | Facility: HOSPITAL | Age: 61
End: 2021-09-08
Attending: ORTHOPAEDIC SURGERY
Payer: COMMERCIAL

## 2021-09-08 PROCEDURE — 97110 THERAPEUTIC EXERCISES: CPT

## 2021-09-08 NOTE — PROGRESS NOTES
Progress Summary  Pt has attended 5 visits in Physical Therapy.      Dx: L Osteochondral lesion (M89.9,M94.9)         Insurance (Authorized # of Visits):  POC for 10, 20 visits/year          Authorizing Physician: Dr. Keyon Pagan  Next MD visit: none scheduled squatting activities without excessive femoral IR/ADD  · Pt will improve SLS to >10s to improve safety and independence with gait on uneven surfaces such as grass  · The patient will demonstrate proper knee extension during stance phase of gait while ambul into knee extension grade II-III multiple bouts Man there  Post femoral glides, mobs into knee extension grade II-III multiple bouts X X    X X X X    HEP:   Access Code: [email or web address]    Exercises  Supine Heel Slide - 3 x daily - 7 x weekly - 1 sets - 10 reps

## 2021-09-14 ENCOUNTER — APPOINTMENT (OUTPATIENT)
Dept: PHYSICAL THERAPY | Facility: HOSPITAL | Age: 61
End: 2021-09-14
Attending: ORTHOPAEDIC SURGERY
Payer: COMMERCIAL

## 2021-09-17 ENCOUNTER — OFFICE VISIT (OUTPATIENT)
Dept: FAMILY MEDICINE CLINIC | Facility: CLINIC | Age: 61
End: 2021-09-17
Payer: COMMERCIAL

## 2021-09-17 ENCOUNTER — APPOINTMENT (OUTPATIENT)
Dept: PHYSICAL THERAPY | Facility: HOSPITAL | Age: 61
End: 2021-09-17
Attending: ORTHOPAEDIC SURGERY
Payer: COMMERCIAL

## 2021-09-17 VITALS
DIASTOLIC BLOOD PRESSURE: 70 MMHG | BODY MASS INDEX: 28.04 KG/M2 | SYSTOLIC BLOOD PRESSURE: 112 MMHG | RESPIRATION RATE: 16 BRPM | HEART RATE: 67 BPM | WEIGHT: 185 LBS | HEIGHT: 68 IN | TEMPERATURE: 97 F | OXYGEN SATURATION: 98 %

## 2021-09-17 DIAGNOSIS — Z00.00 ANNUAL PHYSICAL EXAM: Primary | ICD-10-CM

## 2021-09-17 DIAGNOSIS — Z13.220 LIPID SCREENING: ICD-10-CM

## 2021-09-17 DIAGNOSIS — Z12.5 PROSTATE CANCER SCREENING: ICD-10-CM

## 2021-09-17 LAB
ALBUMIN SERPL-MCNC: 4 G/DL (ref 3.4–5)
ALBUMIN/GLOB SERPL: 1.2 {RATIO} (ref 1–2)
ALP LIVER SERPL-CCNC: 71 U/L
ALT SERPL-CCNC: 59 U/L
ANION GAP SERPL CALC-SCNC: 2 MMOL/L (ref 0–18)
AST SERPL-CCNC: 30 U/L (ref 15–37)
BILIRUB SERPL-MCNC: 0.8 MG/DL (ref 0.1–2)
BUN BLD-MCNC: 18 MG/DL (ref 7–18)
CALCIUM BLD-MCNC: 8.9 MG/DL (ref 8.5–10.1)
CHLORIDE SERPL-SCNC: 107 MMOL/L (ref 98–112)
CHOLEST SERPL-MCNC: 233 MG/DL (ref ?–200)
CO2 SERPL-SCNC: 28 MMOL/L (ref 21–32)
COMPLEXED PSA SERPL-MCNC: 1.07 NG/ML (ref ?–4)
CREAT BLD-MCNC: 1 MG/DL
GLOBULIN PLAS-MCNC: 3.4 G/DL (ref 2.8–4.4)
GLUCOSE BLD-MCNC: 90 MG/DL (ref 70–99)
HDLC SERPL-MCNC: 55 MG/DL (ref 40–59)
LDLC SERPL CALC-MCNC: 159 MG/DL (ref ?–100)
NONHDLC SERPL-MCNC: 178 MG/DL (ref ?–130)
OSMOLALITY SERPL CALC.SUM OF ELEC: 285 MOSM/KG (ref 275–295)
PATIENT FASTING Y/N/NP: YES
PATIENT FASTING Y/N/NP: YES
POTASSIUM SERPL-SCNC: 4.6 MMOL/L (ref 3.5–5.1)
PROT SERPL-MCNC: 7.4 G/DL (ref 6.4–8.2)
SODIUM SERPL-SCNC: 137 MMOL/L (ref 136–145)
TRIGL SERPL-MCNC: 107 MG/DL (ref 30–149)
VLDLC SERPL CALC-MCNC: 21 MG/DL (ref 0–30)

## 2021-09-17 PROCEDURE — 3074F SYST BP LT 130 MM HG: CPT | Performed by: FAMILY MEDICINE

## 2021-09-17 PROCEDURE — 80061 LIPID PANEL: CPT | Performed by: FAMILY MEDICINE

## 2021-09-17 PROCEDURE — 3008F BODY MASS INDEX DOCD: CPT | Performed by: FAMILY MEDICINE

## 2021-09-17 PROCEDURE — 99396 PREV VISIT EST AGE 40-64: CPT | Performed by: FAMILY MEDICINE

## 2021-09-17 PROCEDURE — 80053 COMPREHEN METABOLIC PANEL: CPT | Performed by: FAMILY MEDICINE

## 2021-09-17 PROCEDURE — 3078F DIAST BP <80 MM HG: CPT | Performed by: FAMILY MEDICINE

## 2021-09-17 PROCEDURE — 84153 ASSAY OF PSA TOTAL: CPT | Performed by: FAMILY MEDICINE

## 2021-09-17 RX ORDER — DICLOFENAC SODIUM 75 MG/1
TABLET, DELAYED RELEASE ORAL
COMMUNITY
Start: 2021-08-25 | End: 2021-12-06 | Stop reason: ALTCHOICE

## 2021-09-17 NOTE — PROGRESS NOTES
Patient presents with:  Physical: Annual      HPI:   Charmayne Patterson is a 62year old male who presents for a complete physical exam.     Last colonoscopy:  1/2013. Repeat 10 yrs - 2023. Last PSA:  0.88. Immunizations: COVID UTD. Flu annually. pancreatic, lung   • Hypertension Father    • Pulmonary Disease Mother         COPD   • Diabetes Maternal Grandmother       Social History:  Social History    Tobacco Use      Smoking status: Never Smoker      Smokeless tobacco: Never Used    Vaping Use 2. Lipid screening  routine  - COMP METABOLIC PANEL (14)  - LIPID PANEL    3. Prostate cancer screening  Routine.  - PSA SCREEN        Ariella Sy M.D.   [facility]  09/17/21    Return in about 1 year (around 9/17/2022) for Annual Physi

## 2021-09-21 ENCOUNTER — APPOINTMENT (OUTPATIENT)
Dept: PHYSICAL THERAPY | Facility: HOSPITAL | Age: 61
End: 2021-09-21
Attending: ORTHOPAEDIC SURGERY
Payer: COMMERCIAL

## 2021-09-28 ENCOUNTER — APPOINTMENT (OUTPATIENT)
Dept: PHYSICAL THERAPY | Facility: HOSPITAL | Age: 61
End: 2021-09-28
Attending: ORTHOPAEDIC SURGERY
Payer: COMMERCIAL

## 2021-09-28 ENCOUNTER — TELEPHONE (OUTPATIENT)
Dept: ORTHOPEDICS CLINIC | Facility: CLINIC | Age: 61
End: 2021-09-28

## 2021-09-28 NOTE — TELEPHONE ENCOUNTER
Future Appointments   Date Time Provider Alona Shelli   10/6/2021 10:00 AM Kumar Morgan MD [facility]     · Pt scheduled through [facility] for \"My knee is holding a lot of fluid\"  · LOV instructions: Proceed with physical therapy

## 2021-10-01 ENCOUNTER — APPOINTMENT (OUTPATIENT)
Dept: PHYSICAL THERAPY | Facility: HOSPITAL | Age: 61
End: 2021-10-01
Attending: ORTHOPAEDIC SURGERY
Payer: COMMERCIAL

## 2021-10-01 ENCOUNTER — IMMUNIZATION (OUTPATIENT)
Dept: LAB | Facility: HOSPITAL | Age: 61
End: 2021-10-01
Attending: EMERGENCY MEDICINE
Payer: COMMERCIAL

## 2021-10-01 DIAGNOSIS — Z23 NEED FOR VACCINATION: Primary | ICD-10-CM

## 2021-10-01 PROCEDURE — 0003A SARSCOV2 VAC 30MCG/0.3ML IM: CPT

## 2021-10-01 PROCEDURE — 0004A SARSCOV2 VAC 30MCG/0.3ML IM: CPT

## 2021-10-05 ENCOUNTER — APPOINTMENT (OUTPATIENT)
Dept: PHYSICAL THERAPY | Facility: HOSPITAL | Age: 61
End: 2021-10-05
Attending: ORTHOPAEDIC SURGERY
Payer: COMMERCIAL

## 2021-10-06 ENCOUNTER — OFFICE VISIT (OUTPATIENT)
Dept: ORTHOPEDICS CLINIC | Facility: CLINIC | Age: 61
End: 2021-10-06
Payer: COMMERCIAL

## 2021-10-06 DIAGNOSIS — M70.42 PREPATELLAR BURSITIS OF LEFT KNEE: Primary | ICD-10-CM

## 2021-10-06 PROCEDURE — 99024 POSTOP FOLLOW-UP VISIT: CPT | Performed by: ORTHOPAEDIC SURGERY

## 2021-10-06 PROCEDURE — 20610 DRAIN/INJ JOINT/BURSA W/O US: CPT | Performed by: ORTHOPAEDIC SURGERY

## 2021-10-06 NOTE — PROGRESS NOTES
[facility] - [facility]OPED[facility]  [address] [place] [address]     Name: Malena Maradiaga   MRN: [id number]  Date: 10/6/2021    REASON FOR VISIT: Second Post-Surgical Visit  Date of Surgery: 8/10/20 bursal swelling with fluid accumulation along the anterior aspect of the knee. No obvious peripheral edema noted. Distal neurovascular exam demonstrates normal perfusion, intact sensation to light touch and full strength.      Examination of the contra

## 2021-10-06 NOTE — PROCEDURES
Left Knee aspiration    Name: Stephen Welch   MRN: [id number]  Date: 10/6/2021     Clinical Indications:   Persistent prepatellar bursitis    After informed consent, the injection site was marked, sterilized with topical chlorhexidine antiseptic, and

## 2021-10-07 ENCOUNTER — TELEPHONE (OUTPATIENT)
Dept: FAMILY MEDICINE CLINIC | Facility: CLINIC | Age: 61
End: 2021-10-07

## 2021-10-07 DIAGNOSIS — Z23 NEED FOR VACCINATION: Primary | ICD-10-CM

## 2021-10-07 NOTE — TELEPHONE ENCOUNTER
Patient has appt for TDAP on Monday October 11, 2021. Order pended for your review. Last OV 9/17/21.

## 2021-10-08 ENCOUNTER — APPOINTMENT (OUTPATIENT)
Dept: PHYSICAL THERAPY | Facility: HOSPITAL | Age: 61
End: 2021-10-08
Attending: ORTHOPAEDIC SURGERY
Payer: COMMERCIAL

## 2021-10-11 ENCOUNTER — NURSE ONLY (OUTPATIENT)
Dept: FAMILY MEDICINE CLINIC | Facility: CLINIC | Age: 61
End: 2021-10-11
Payer: COMMERCIAL

## 2021-10-11 VITALS — TEMPERATURE: 98 F

## 2021-10-11 DIAGNOSIS — Z23 NEEDS FLU SHOT: Primary | ICD-10-CM

## 2021-10-11 PROCEDURE — 90471 IMMUNIZATION ADMIN: CPT | Performed by: FAMILY MEDICINE

## 2021-10-11 PROCEDURE — 90715 TDAP VACCINE 7 YRS/> IM: CPT | Performed by: FAMILY MEDICINE

## 2021-10-11 NOTE — PROGRESS NOTES
Patient is here today for his TDAP. He will be having a grandson soon. The TDAP was ordered by Dr. Trini Morris M.D. The TDAP is administered into the left deltoid. He is given the VIS.

## 2021-10-12 ENCOUNTER — APPOINTMENT (OUTPATIENT)
Dept: PHYSICAL THERAPY | Facility: HOSPITAL | Age: 61
End: 2021-10-12
Attending: ORTHOPAEDIC SURGERY
Payer: COMMERCIAL

## 2021-10-15 ENCOUNTER — APPOINTMENT (OUTPATIENT)
Dept: PHYSICAL THERAPY | Facility: HOSPITAL | Age: 61
End: 2021-10-15
Attending: ORTHOPAEDIC SURGERY
Payer: COMMERCIAL

## 2021-12-06 ENCOUNTER — OFFICE VISIT (OUTPATIENT)
Dept: ORTHOPEDICS CLINIC | Facility: CLINIC | Age: 61
End: 2021-12-06
Payer: COMMERCIAL

## 2021-12-06 VITALS — OXYGEN SATURATION: 97 % | BODY MASS INDEX: 28.04 KG/M2 | HEART RATE: 66 BPM | WEIGHT: 185 LBS | HEIGHT: 68 IN

## 2021-12-06 DIAGNOSIS — M95.8 OSTEOCHONDRAL DEFECT: Primary | ICD-10-CM

## 2021-12-06 PROCEDURE — 99214 OFFICE O/P EST MOD 30 MIN: CPT | Performed by: ORTHOPAEDIC SURGERY

## 2021-12-06 PROCEDURE — 3008F BODY MASS INDEX DOCD: CPT | Performed by: ORTHOPAEDIC SURGERY

## 2021-12-06 NOTE — PROGRESS NOTES
[facility]  [address] [facility] [address] [facility] [address]     Name: Bernard Wleler   MRN: [id number]  Date: 12/6/2021    REASON FOR VISIT: Third Post-Surgical Visit  Date of Surgery: 8/10/202 Psychiatric:         Mood and Affect: Mood normal.     Examination of the left knee demonstrates:     Full knee range of motion from 0 to 135 degrees. Overall normal appearance from a swelling standpoint. No instability.   No specific focal joint line t

## 2021-12-08 ENCOUNTER — HOSPITAL ENCOUNTER (OUTPATIENT)
Dept: MRI IMAGING | Facility: HOSPITAL | Age: 61
Discharge: HOME | End: 2021-12-08
Attending: ORTHOPAEDIC SURGERY
Payer: COMMERCIAL

## 2021-12-08 DIAGNOSIS — M95.8 OSTEOCHONDRAL DEFECT: ICD-10-CM

## 2021-12-08 PROCEDURE — 73721 MRI JNT OF LWR EXTRE W/O DYE: CPT | Performed by: ORTHOPAEDIC SURGERY

## 2021-12-10 ENCOUNTER — OFFICE VISIT (OUTPATIENT)
Dept: ORTHOPEDICS CLINIC | Facility: CLINIC | Age: 61
End: 2021-12-10
Payer: COMMERCIAL

## 2021-12-10 DIAGNOSIS — M95.8 OSTEOCHONDRAL DEFECT: Primary | ICD-10-CM

## 2021-12-10 PROCEDURE — 99213 OFFICE O/P EST LOW 20 MIN: CPT | Performed by: ORTHOPAEDIC SURGERY

## 2021-12-10 NOTE — PROGRESS NOTES
[facility]  [address]  [address] [place] [address]     Name: Jigar Lai   MRN: [id number]  Date: 12/10/2021     REASON FOR VISIT: Follow-up evaluation after repeat MRI of the knee    INTERVAL HIS neurovascular exam demonstrates normal perfusion, intact sensation to light touch and full strength. No obvious peripheral edema noted. Distal neurovascular exam demonstrates normal perfusion, intact sensation to light touch and full strength.      Ex previously devoid of cartilage. This cartilage is not contiguous and there is a small fissure between the articular cartilage measuring 2 mm.   The more medial portion of the cartilage is also not firmly attached to the underlying cortical bone and is disp cartilage loss with underlying bone involvement. All questions were answered to his satisfaction. FOLLOW-UP:   Plan to return to clinic in approximately 3 months for repeat clinical evaluation. Rodrigo Potts.  MD Jamel  Knee, Shoulder, & Elbow Igor

## 2022-03-14 ENCOUNTER — OFFICE VISIT (OUTPATIENT)
Dept: ORTHOPEDICS CLINIC | Facility: CLINIC | Age: 62
End: 2022-03-14
Payer: COMMERCIAL

## 2022-03-14 VITALS — BODY MASS INDEX: 28.04 KG/M2 | HEIGHT: 68 IN | WEIGHT: 185 LBS

## 2022-03-14 DIAGNOSIS — M95.8 OSTEOCHONDRAL DEFECT: Primary | ICD-10-CM

## 2022-03-14 PROCEDURE — 3008F BODY MASS INDEX DOCD: CPT | Performed by: ORTHOPAEDIC SURGERY

## 2022-03-14 PROCEDURE — 99213 OFFICE O/P EST LOW 20 MIN: CPT | Performed by: ORTHOPAEDIC SURGERY

## 2022-03-28 PROCEDURE — 88305 TISSUE EXAM BY PATHOLOGIST: CPT | Performed by: STUDENT IN AN ORGANIZED HEALTH CARE EDUCATION/TRAINING PROGRAM

## 2022-03-29 ENCOUNTER — LAB REQUISITION (OUTPATIENT)
Dept: LAB | Facility: HOSPITAL | Age: 62
End: 2022-03-29
Payer: COMMERCIAL

## 2022-05-09 ENCOUNTER — IMMUNIZATION (OUTPATIENT)
Dept: LAB | Age: 62
End: 2022-05-09
Attending: EMERGENCY MEDICINE
Payer: COMMERCIAL

## 2022-05-09 DIAGNOSIS — Z23 NEED FOR VACCINATION: Primary | ICD-10-CM

## 2022-05-09 PROCEDURE — 0054A SARSCOV2 VAC 30MCG TRS SUCR: CPT

## 2022-09-30 ENCOUNTER — IMMUNIZATION (OUTPATIENT)
Dept: LAB | Age: 62
End: 2022-09-30
Attending: EMERGENCY MEDICINE

## 2022-09-30 DIAGNOSIS — Z23 NEED FOR VACCINATION: Primary | ICD-10-CM

## 2022-09-30 PROCEDURE — 0124A SARSCOV2 VAC BVL 30MCG/0.3ML: CPT

## 2022-10-26 ENCOUNTER — IMMUNIZATION (OUTPATIENT)
Dept: LAB | Facility: HOSPITAL | Age: 62
End: 2022-10-26
Attending: PREVENTIVE MEDICINE
Payer: COMMERCIAL

## 2022-10-26 DIAGNOSIS — Z23 NEED FOR VACCINATION: Primary | ICD-10-CM

## 2022-10-26 PROCEDURE — 90471 IMMUNIZATION ADMIN: CPT

## 2022-11-19 ENCOUNTER — TELEPHONE (OUTPATIENT)
Dept: FAMILY MEDICINE CLINIC | Facility: CLINIC | Age: 62
End: 2022-11-19

## 2022-11-19 DIAGNOSIS — Z13.220 LIPID SCREENING: Primary | ICD-10-CM

## 2022-11-19 DIAGNOSIS — Z12.5 PROSTATE CANCER SCREENING: ICD-10-CM

## 2022-11-19 NOTE — TELEPHONE ENCOUNTER
Please enter lab orders for the patient's upcoming physical appointment. Physical scheduled: Your appointments     Date & Time Appointment Department [facility])    Sam 10, 2023  2:30 PM CST Adult Physical with Jelani Kimball MD [address] [facility]  ([address])    PLEASE NOTE - Most insurances allow a Complete Physical once every 366 days. Please schedule accordingly. Please arrive 15 minutes prior to your scheduled appointment. Please also bring your Insurance card, Photo ID, and your medication bottles or a list of your current medication. If you no longer require this appointment, please contact your physician office to cancel. Nhan Jackson [address] [facility] [phone]         [facility]  [facility])     The patient has been notified to complete fasting labs prior to their physical appointment.

## 2023-01-03 ENCOUNTER — LAB ENCOUNTER (OUTPATIENT)
Dept: LAB | Age: 63
End: 2023-01-03
Attending: FAMILY MEDICINE
Payer: COMMERCIAL

## 2023-01-03 DIAGNOSIS — Z13.220 LIPID SCREENING: ICD-10-CM

## 2023-01-03 DIAGNOSIS — Z12.5 PROSTATE CANCER SCREENING: ICD-10-CM

## 2023-01-03 LAB
ALBUMIN SERPL-MCNC: 3.9 G/DL (ref 3.4–5)
ALBUMIN/GLOB SERPL: 1.1 {RATIO} (ref 1–2)
ALP LIVER SERPL-CCNC: 76 U/L
ALT SERPL-CCNC: 27 U/L
ANION GAP SERPL CALC-SCNC: 5 MMOL/L (ref 0–18)
AST SERPL-CCNC: 21 U/L (ref 15–37)
BILIRUB SERPL-MCNC: 0.8 MG/DL (ref 0.1–2)
BUN BLD-MCNC: 15 MG/DL (ref 7–18)
CALCIUM BLD-MCNC: 9.1 MG/DL (ref 8.5–10.1)
CHLORIDE SERPL-SCNC: 109 MMOL/L (ref 98–112)
CHOLEST SERPL-MCNC: 226 MG/DL (ref ?–200)
CO2 SERPL-SCNC: 29 MMOL/L (ref 21–32)
COMPLEXED PSA SERPL-MCNC: 1.44 NG/ML (ref ?–4)
CREAT BLD-MCNC: 1.01 MG/DL
FASTING PATIENT LIPID ANSWER: YES
FASTING STATUS PATIENT QL REPORTED: YES
GFR SERPLBLD BASED ON 1.73 SQ M-ARVRAT: 85 ML/MIN/1.73M2 (ref 60–?)
GLOBULIN PLAS-MCNC: 3.5 G/DL (ref 2.8–4.4)
GLUCOSE BLD-MCNC: 100 MG/DL (ref 70–99)
HDLC SERPL-MCNC: 57 MG/DL (ref 40–59)
LDLC SERPL CALC-MCNC: 145 MG/DL (ref ?–100)
NONHDLC SERPL-MCNC: 169 MG/DL (ref ?–130)
OSMOLALITY SERPL CALC.SUM OF ELEC: 297 MOSM/KG (ref 275–295)
POTASSIUM SERPL-SCNC: 4.4 MMOL/L (ref 3.5–5.1)
PROT SERPL-MCNC: 7.4 G/DL (ref 6.4–8.2)
SODIUM SERPL-SCNC: 143 MMOL/L (ref 136–145)
TRIGL SERPL-MCNC: 137 MG/DL (ref 30–149)
VLDLC SERPL CALC-MCNC: 26 MG/DL (ref 0–30)

## 2023-01-03 PROCEDURE — 80053 COMPREHEN METABOLIC PANEL: CPT

## 2023-01-03 PROCEDURE — 80061 LIPID PANEL: CPT

## 2023-01-03 PROCEDURE — 36415 COLL VENOUS BLD VENIPUNCTURE: CPT

## 2023-01-08 ENCOUNTER — TELEPHONE (OUTPATIENT)
Dept: INTERNAL MEDICINE CLINIC | Facility: HOSPITAL | Age: 63
End: 2023-01-08

## 2023-01-08 LAB — AMB EXT COVID-19 RESULT: DETECTED

## 2023-01-11 ENCOUNTER — TELEMEDICINE (OUTPATIENT)
Dept: FAMILY MEDICINE CLINIC | Facility: CLINIC | Age: 63
End: 2023-01-11
Payer: COMMERCIAL

## 2023-01-11 DIAGNOSIS — Z12.11 SCREENING FOR COLON CANCER: ICD-10-CM

## 2023-01-11 DIAGNOSIS — U07.1 COVID-19 VIRUS INFECTION: Primary | ICD-10-CM

## 2023-01-11 PROCEDURE — 99213 OFFICE O/P EST LOW 20 MIN: CPT | Performed by: NURSE PRACTITIONER

## 2023-01-11 NOTE — PATIENT INSTRUCTIONS
- You should self isolate in your home, away from non-COVID positive household contacts, if possible, for 5 days from symptom onset with first day of symptoms counting as day 0. Then you may resume going in public or to work with diligent mask wearing for another 5 days. There is no need to isolate from other family members who are also COVID positive. If you have to be around non-COVID positive household contacts (such as going to the bathroom or getting food) you should wear a mask. - Use a premixed saline nasal spray, available over the counter, such as Weston Nasal Spray (or generic equivalent), 2-4 times daily. Do one spray each nostril and gently blow nose after.     -You should get rest, drink fluids and eat as able. You may use acetaminophen or ibuprofen as needed for fevers, body aches etc. Follow 's instructions for dose and time intervals.     -I would recommend getting a commercially available pulse oximeter, available on [employer] or at local stores/pharmacies. This is a small device that goes on your finger to monitor your oxygen levels. If your readings are<92% for a prolonged period (3 minutes at rest) you should  go to the ER. You should also go to the ER for any severe shortness of breath, lethargy, chest pain/tightness or any other symptoms you find concerning.

## 2023-01-25 ENCOUNTER — OFFICE VISIT (OUTPATIENT)
Dept: FAMILY MEDICINE CLINIC | Facility: CLINIC | Age: 63
End: 2023-01-25
Payer: COMMERCIAL

## 2023-01-25 ENCOUNTER — TELEPHONE (OUTPATIENT)
Dept: FAMILY MEDICINE CLINIC | Facility: CLINIC | Age: 63
End: 2023-01-25

## 2023-01-25 VITALS
HEIGHT: 68 IN | RESPIRATION RATE: 16 BRPM | BODY MASS INDEX: 28.64 KG/M2 | HEART RATE: 78 BPM | SYSTOLIC BLOOD PRESSURE: 118 MMHG | WEIGHT: 189 LBS | DIASTOLIC BLOOD PRESSURE: 70 MMHG

## 2023-01-25 DIAGNOSIS — Z12.11 SCREENING FOR COLON CANCER: ICD-10-CM

## 2023-01-25 DIAGNOSIS — E78.5 DYSLIPIDEMIA: ICD-10-CM

## 2023-01-25 DIAGNOSIS — Z00.00 ROUTINE PHYSICAL EXAMINATION: Primary | ICD-10-CM

## 2023-01-25 DIAGNOSIS — N52.9 ERECTILE DYSFUNCTION, UNSPECIFIED ERECTILE DYSFUNCTION TYPE: ICD-10-CM

## 2023-01-25 PROCEDURE — 3074F SYST BP LT 130 MM HG: CPT | Performed by: NURSE PRACTITIONER

## 2023-01-25 PROCEDURE — 99213 OFFICE O/P EST LOW 20 MIN: CPT | Performed by: NURSE PRACTITIONER

## 2023-01-25 PROCEDURE — 3078F DIAST BP <80 MM HG: CPT | Performed by: NURSE PRACTITIONER

## 2023-01-25 PROCEDURE — 3008F BODY MASS INDEX DOCD: CPT | Performed by: NURSE PRACTITIONER

## 2023-01-25 PROCEDURE — 99396 PREV VISIT EST AGE 40-64: CPT | Performed by: NURSE PRACTITIONER

## 2023-01-25 RX ORDER — TADALAFIL 5 MG/1
5 TABLET ORAL
Qty: 8 TABLET | Refills: 0 | Status: SHIPPED | OUTPATIENT
Start: 2023-01-25

## 2023-01-25 NOTE — TELEPHONE ENCOUNTER
Received PA tadalafil 5 MG Oral Tab  Key [id number]   1960  Patient notified of this process   Placed and routed to pat

## 2023-01-26 NOTE — TELEPHONE ENCOUNTER
Received an electronic response back  Payer:  [address] [place] [address]  [phone]  [address]  [phone]    Drug is not covered by plan     Patient can use Good[facility]

## 2023-01-29 ENCOUNTER — APPOINTMENT (OUTPATIENT)
Dept: LAB | Age: 63
End: 2023-01-29
Attending: NURSE PRACTITIONER
Payer: COMMERCIAL

## 2023-03-24 ENCOUNTER — TELEPHONE (OUTPATIENT)
Dept: FAMILY MEDICINE CLINIC | Facility: CLINIC | Age: 63
End: 2023-03-24

## 2023-03-24 RX ORDER — OFLOXACIN 3 MG/ML
2 SOLUTION/ DROPS OPHTHALMIC 4 TIMES DAILY
Qty: 10 ML | Refills: 0 | Status: SHIPPED | OUTPATIENT
Start: 2023-03-24

## 2023-03-24 RX ORDER — POLYMYXIN B SULFATE AND TRIMETHOPRIM 1; 10000 MG/ML; [USP'U]/ML
1 SOLUTION OPHTHALMIC EVERY 4 HOURS
Qty: 10 ML | Refills: 0 | Status: SHIPPED | OUTPATIENT
Start: 2023-03-24 | End: 2023-03-24

## 2023-03-24 NOTE — TELEPHONE ENCOUNTER
Ofloxacin called in    If this is also not available or expensive, next option is erythromycin eye ointement

## 2023-03-24 NOTE — TELEPHONE ENCOUNTER
Pt is calling he is working in the [employer] and he believes he has pink eye and they told him to call his PCP for a script.  Please call into Norah in [facility]

## 2023-03-24 NOTE — TELEPHONE ENCOUNTER
[facility] they do not have in stock and will not get them until May.  Please send a new script similar

## 2023-03-24 NOTE — TELEPHONE ENCOUNTER
Called patient for last 2 days has had red itchy draining eyes is working in [facility] and they told him to call  and get eye drops sent in.

## 2023-04-06 ENCOUNTER — LAB ENCOUNTER (OUTPATIENT)
Dept: LAB | Age: 63
End: 2023-04-06
Attending: NURSE PRACTITIONER
Payer: COMMERCIAL

## 2023-04-06 DIAGNOSIS — E78.5 DYSLIPIDEMIA: ICD-10-CM

## 2023-04-06 DIAGNOSIS — Z00.00 ROUTINE PHYSICAL EXAMINATION: ICD-10-CM

## 2023-04-06 LAB
CHOLEST SERPL-MCNC: 199 MG/DL (ref ?–200)
FASTING PATIENT LIPID ANSWER: YES
HDLC SERPL-MCNC: 48 MG/DL (ref 40–59)
LDLC SERPL CALC-MCNC: 131 MG/DL (ref ?–100)
NONHDLC SERPL-MCNC: 151 MG/DL (ref ?–130)
TRIGL SERPL-MCNC: 112 MG/DL (ref 30–149)
TSI SER-ACNC: 2.74 MIU/ML (ref 0.36–3.74)
VLDLC SERPL CALC-MCNC: 20 MG/DL (ref 0–30)

## 2023-04-06 PROCEDURE — 80061 LIPID PANEL: CPT

## 2023-04-06 PROCEDURE — 84443 ASSAY THYROID STIM HORMONE: CPT

## 2023-04-06 PROCEDURE — 36415 COLL VENOUS BLD VENIPUNCTURE: CPT

## 2023-04-11 ENCOUNTER — TELEPHONE (OUTPATIENT)
Dept: FAMILY MEDICINE CLINIC | Facility: CLINIC | Age: 63
End: 2023-04-11

## 2023-04-11 NOTE — TELEPHONE ENCOUNTER
Received PA from [place] for Tadalafil 5 mg    KEY: [id number]    LM for patient explaining the PA process,     Placed in Emil Conley RN's in box for review.     (Not covered in the past and patient used Good RX)

## 2023-04-13 RX ORDER — TADALAFIL 5 MG/1
TABLET ORAL
Qty: 8 TABLET | Refills: 3 | OUTPATIENT
Start: 2023-04-13

## 2023-06-02 ENCOUNTER — OFFICE VISIT (OUTPATIENT)
Dept: ORTHOPEDICS CLINIC | Facility: CLINIC | Age: 63
End: 2023-06-02
Payer: COMMERCIAL

## 2023-06-02 DIAGNOSIS — M95.8 OSTEOCHONDRAL DEFECT: Primary | ICD-10-CM

## 2023-06-02 DIAGNOSIS — M25.562 LEFT KNEE PAIN, UNSPECIFIED CHRONICITY: ICD-10-CM

## 2023-06-02 RX ORDER — KETOROLAC TROMETHAMINE 30 MG/ML
30 INJECTION, SOLUTION INTRAMUSCULAR; INTRAVENOUS ONCE
Status: COMPLETED | OUTPATIENT
Start: 2023-06-02 | End: 2023-06-02

## 2023-06-02 RX ORDER — TRIAMCINOLONE ACETONIDE 40 MG/ML
40 INJECTION, SUSPENSION INTRA-ARTICULAR; INTRAMUSCULAR ONCE
Status: COMPLETED | OUTPATIENT
Start: 2023-06-02 | End: 2023-06-02

## 2023-06-02 RX ADMIN — TRIAMCINOLONE ACETONIDE 40 MG: 40 INJECTION, SUSPENSION INTRA-ARTICULAR; INTRAMUSCULAR at 12:23:00

## 2023-06-02 RX ADMIN — KETOROLAC TROMETHAMINE 30 MG: 30 INJECTION, SOLUTION INTRAMUSCULAR; INTRAVENOUS at 12:23:00

## 2023-06-02 NOTE — PROCEDURES
Left Knee Intra-articular Injection    Name: Alisha Beckford   MRN: [id number]  Date: 6/2/2023     Clinical Indications:   Chondral lesion with symptoms refractory to conservative measures. After informed consent, the injection site was marked, sterilized with topical chlorhexidine antiseptic, and locally anesthetized with skin refrigerant. The patient was situation in a comfortable position. Using sterile technique: 1 mL of 30mg/mL of Ketorolac, 2 mL of 0.5% Bupivicaine, 2 mL of 1% Lidocaine, and 1 mL of 40 mg/ml Triamcinolone was injected utilizing anterolateral approach with a 22 gauge needle. A band-aid was applied. The patient tolerated the procedure well. Kodi Ruano. Jose A Dixon MD  Knee, Shoulder, & Elbow Surgery / Sports Medicine Specialist  THE [facility] Orthopaedic Surgery  [address], [address]   [address] [email or web address]  t: [phone]  o: [phone]  f: [phone]

## 2023-08-08 ENCOUNTER — TELEPHONE (OUTPATIENT)
Dept: FAMILY MEDICINE CLINIC | Facility: CLINIC | Age: 63
End: 2023-08-08

## 2023-08-09 NOTE — TELEPHONE ENCOUNTER
Received prior authorization for Tadalafil 5 mg tablets. Previous PA has been denied and patient told to use Good [facility] in April. Spoke to patient to let him know the PA process and he verbally understands.

## 2023-08-09 NOTE — TELEPHONE ENCOUNTER
Closed    Other      Payer: Kimberly Cummins [address]    [phone]    [phone]   Drug is not covered by plan

## 2023-10-05 ENCOUNTER — LAB REQUISITION (OUTPATIENT)
Dept: LAB | Facility: HOSPITAL | Age: 63
End: 2023-10-05
Payer: COMMERCIAL

## 2023-10-05 DIAGNOSIS — D48.5 NEOPLASM OF UNCERTAIN BEHAVIOR OF SKIN: ICD-10-CM

## 2023-10-05 PROCEDURE — 88305 TISSUE EXAM BY PATHOLOGIST: CPT | Performed by: STUDENT IN AN ORGANIZED HEALTH CARE EDUCATION/TRAINING PROGRAM

## 2023-10-27 RX ORDER — TADALAFIL 5 MG/1
5 TABLET ORAL
Qty: 8 TABLET | Refills: 3 | Status: SHIPPED | OUTPATIENT
Start: 2023-10-27

## 2023-10-27 NOTE — TELEPHONE ENCOUNTER
Refill request for:    Requested Prescriptions     Pending Prescriptions Disp Refills    tadalafil 5 MG Oral Tab 8 tablet 3     Sig: Take 1 tablet (5 mg total) by mouth daily as needed for Erectile Dysfunction. Last Prescribed Quantity Refills   4/11/23 8 3     LOV 1/25/2023     Patient was asked to follow-up in: no follow ups on file. Appointment scheduled: Visit date not found    Medication not on protocol.      # 8 with 3 refills routed to Bronwyn Santana [address] for review

## 2024-03-26 ENCOUNTER — TELEPHONE (OUTPATIENT)
Dept: FAMILY MEDICINE CLINIC | Facility: CLINIC | Age: 64
End: 2024-03-26

## 2024-03-26 DIAGNOSIS — Z12.5 SCREENING FOR MALIGNANT NEOPLASM OF PROSTATE: ICD-10-CM

## 2024-03-26 DIAGNOSIS — Z13.220 SCREENING FOR LIPID DISORDERS: ICD-10-CM

## 2024-03-26 DIAGNOSIS — Z13.0 SCREENING FOR BLOOD DISEASE: ICD-10-CM

## 2024-03-26 DIAGNOSIS — Z13.228 SCREENING FOR METABOLIC DISORDER: ICD-10-CM

## 2024-03-26 DIAGNOSIS — Z13.29 SCREENING FOR THYROID DISORDER: ICD-10-CM

## 2024-03-26 NOTE — TELEPHONE ENCOUNTER
Please enter lab orders for the patient's upcoming physical appointment.     Physical scheduled:   Your appointments       Date & Time Appointment Department ([place])    Apr 04, 2024 10:30 AM CDT Adult Physical with Ed Vazquez NP [facility] ([facility])    PLEASE NOTE - Most insurances allow a Complete Physical once every 366 days. Please schedule accordingly.    Please arrive 15 minutes prior to your scheduled appointment. Please also bring your Insurance card, Photo ID, and your medication bottles or a list of your current medication.    If you no longer require this appointment, please contact your physician office to cancel.              [facility]  [address] [place] [address]  [phone]           Preferred lab: [facility] ([facility])     The patient has been notified to complete fasting labs prior to their physical appointment.

## 2024-03-26 NOTE — TELEPHONE ENCOUNTER
Called and talked to patient he won't be able to get labs done until the day of the visit because he is out of town.

## 2024-04-04 ENCOUNTER — OFFICE VISIT (OUTPATIENT)
Dept: FAMILY MEDICINE CLINIC | Facility: CLINIC | Age: 64
End: 2024-04-04
Payer: COMMERCIAL

## 2024-04-04 ENCOUNTER — PATIENT MESSAGE (OUTPATIENT)
Dept: FAMILY MEDICINE CLINIC | Facility: CLINIC | Age: 64
End: 2024-04-04

## 2024-04-04 ENCOUNTER — LAB ENCOUNTER (OUTPATIENT)
Dept: LAB | Facility: HOSPITAL | Age: 64
End: 2024-04-04
Attending: NURSE PRACTITIONER
Payer: COMMERCIAL

## 2024-04-04 VITALS
OXYGEN SATURATION: 98 % | BODY MASS INDEX: 28.09 KG/M2 | DIASTOLIC BLOOD PRESSURE: 80 MMHG | HEART RATE: 66 BPM | HEIGHT: 67 IN | TEMPERATURE: 97 F | WEIGHT: 179 LBS | SYSTOLIC BLOOD PRESSURE: 116 MMHG | RESPIRATION RATE: 16 BRPM

## 2024-04-04 DIAGNOSIS — Z12.11 SCREENING FOR COLON CANCER: ICD-10-CM

## 2024-04-04 DIAGNOSIS — E78.5 DYSLIPIDEMIA: ICD-10-CM

## 2024-04-04 DIAGNOSIS — N52.9 ERECTILE DYSFUNCTION, UNSPECIFIED ERECTILE DYSFUNCTION TYPE: ICD-10-CM

## 2024-04-04 DIAGNOSIS — Z00.00 ROUTINE PHYSICAL EXAMINATION: Primary | ICD-10-CM

## 2024-04-04 PROCEDURE — 82274 ASSAY TEST FOR BLOOD FECAL: CPT

## 2024-04-04 PROCEDURE — 99396 PREV VISIT EST AGE 40-64: CPT | Performed by: NURSE PRACTITIONER

## 2024-04-04 RX ORDER — TADALAFIL 10 MG/1
10 TABLET ORAL
Qty: 8 TABLET | Refills: 5 | Status: SHIPPED | OUTPATIENT
Start: 2024-04-04

## 2024-04-04 NOTE — PROGRESS NOTES
Shar Romero is a 63 year old male who presents for a complete physical exam.     HPI:   Pt has no acute complaints.    Has prior diagnosis of ED, manages with tadalafil 5mg with inadequate response. Denies weak/slow stream, urgency, frequency, hematuria. Has not treated with anything.       Again, noted with dyslipidemia on screening labs, T chol- 210, HDL- 50, trgis-130, LDL- 134. Is not on any medications for this. Reports has been watching his diet and exercising. Denies chest pain, palpitations, SOB, ARREGUIN, headaches, dizziness, lightheadedness, visual changes.      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 Is  with 2 grown children, 3 grandchildren. Does not watch diet but planing to get more serious about this. Does not exercise routinely but planning to resume. Reports is non-smoker. Drinks 0-2 alcoholic drinks per week.     Colonoscopy:  ordered as below    Wt Readings from Last 6 Encounters:   04/04/24 179 lb (81.2 kg)   01/25/23 189 lb (85.7 kg)   03/14/22 185 lb (83.9 kg)   12/06/21 185 lb (83.9 kg)   09/17/21 185 lb (83.9 kg)   08/10/21 180 lb 12.4 oz (82 kg)       Cholesterol, Total (mg/dL)   Date Value   04/06/2023 199   01/03/2023 226 (H)   09/17/2021 233 (H)   02/27/2020 202     CHOLESTEROL (mg/dL)   Date Value   11/26/2012 186   11/29/2011 206 (H)     HDL Cholesterol (mg/dL)   Date Value   04/06/2023 48   01/03/2023 57   09/17/2021 55   02/27/2020 52     HDL CHOL (mg/dL)   Date Value   11/26/2012 48   11/29/2011 45     LDL Cholesterol (mg/dL)   Date Value   04/06/2023 131 (H)   01/03/2023 145 (H)   09/17/2021 159 (H)     LDL CHOLESTROL (mg/dL)   Date Value   11/26/2012 120   11/29/2011 139 (H)     AST (U/L)   Date Value   01/03/2023 21   09/17/2021 30   02/15/2017 12 (L)   11/26/2012 27   11/29/2011 17     ALT (U/L)   Date Value   01/03/2023 27   09/17/2021 59   02/15/2017 29   11/26/2012 25   11/29/2011 31      Current Outpatient Medications    Medication Sig Dispense Refill    tadalafil 5 MG Oral Tab Take 1 tablet (5 mg total) by mouth daily as needed for Erectile Dysfunction. 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 Arevalo    TONSILLECTOMY  1966      Family History   Problem Relation Age of Onset    Cancer Father         pancreatic, lung    Hypertension Father     Pulmonary Disease Mother         COPD    Diabetes Maternal Grandmother       Social History     Socioeconomic History    Marital status:    Tobacco Use    Smoking status: Never    Smokeless tobacco: Never   Vaping Use    Vaping Use: Never used   Substance and Sexual Activity    Alcohol use: Yes     Alcohol/week: 4.0 standard drinks of alcohol     Types: 2 Cans of beer, 2 Shots of liquor per week     Comment: occasional    Drug use: No   Other Topics Concern    Caffeine Concern No     Comment: occasional coke, coffee    Exercise Yes     Comment: 3-4x weekly (gym)    Seat Belt Yes      Social History: as above     Health Maintenance  Immunizations: Recommend flu vaccine for 5788-5121 flu season.     Immunization History   Administered Date(s) Administered    Covid-19 Vaccine Pfizer 30 mcg/0.3 ml 12/19/2020, 01/09/2021, 10/01/2021    Covid-19 Vaccine Pfizer Bivalent 30mcg/0.3mL 09/30/2022    Covid-19 Vaccine Pfizer Juan Carlos-Sucrose 30 mcg/0.3 ml 05/09/2022    FLULAVAL 6 months & older 0.5 ml Prefilled syringe (03279) 10/26/2022    FLUZONE 6 months and older PFS 0.5 ml (58958) 10/17/2023    Hep B, Unspecified Formulation 01/01/1994    Influenza 10/07/2019, 09/15/2020, 09/15/2021    MMR 11/17/2006, 10/07/2013    Pfizer Covid-19 Vaccine 30mcg/0.3ml 12yrs+ (3128-5419) 10/17/2023    TDAP 10/11/2021    Tetanus 01/01/2008    Varicella  11/20/2007    Zoster Vaccine Recombinant Adjuvanted (Shingrix) 08/26/2021         REVIEW OF SYSTEMS:   GENERAL: feels well otherwise. No fever, chills, malaise.  SKIN: denies any unusual skin lesions, rash or pruritis.  EYES: denies blurred/double vision, light sensitivity or visual disturbances.  HEENT: denies nasal congestion, sinus pain/tenderness. Denies neck stiffness.  LUNGS: denies dyspnea, wheezing, SOB, ARREGUIN, cough.  CARDIOVASCULAR: denies chest pain on exertion, palpitations, edema, orthopnea.  GI: denies abdominal pain, heartburn, diarrhea or constipation.  : denies dysuria, frequency, urgency, hematuria, changes in stream or nocturia.   MUSCULOSKELETAL: denies back pain.  NEURO: denies headaches, dizziness, syncope.    EXAM:   /80   Pulse 66   Temp 96.8 °F (36 °C)   Resp 16   Ht 5' 7\" (1.702 m)   Wt 179 lb (81.2 kg)   SpO2 98%   BMI 28.04 kg/m²   Body mass index is 28.04 kg/m².   GENERAL: well developed, well nourished,in no apparent distress  SKIN: Skin warm/dry. Color appropriate for ethnicity. No rashes, suspicious lesions.  HEENT: atraumatic, normocephalic. Bilateral TM clear w/o erythema or bulge.  EYES: PERRLA, EOMI. Conjunctiva are clear. Sclera white  NECK: supple w/o masses/tenderness/ adenopathy, no bruits/JVD, Thyroid symmetrical w/o enlargement  LUNGS: clear to auscultation bilaterally, effort normal. Symmetrical expansion during respirations.  CARDIO: RRR without murmurs. No S3/S4.   GI: Soft, non-tender/non-distended, BS(+)x4, no masses, HSM or CVA tenderness.  MUSCULOSKELETAL: muscle strength grade 5 to all extremities, back non-tender.   EXTREMITIES: no edema, full ROM to all extremities. Patellar DTR 2+ bilaterally   NEURO: A/Ox3, cranial nerves II-XII intact, motor/sensory function grossly intact.     ASSESSMENT AND PLAN:     HEALTH MAINTENANCE:     Encounter Diagnoses   Name Primary?    Routine physical examination- adult male in his usual state of health. Discussed  importance of getting routine exercise for at least 30 minutes daily and making healthy diet choices. Labs reviewed in office today.    Yes    Erectile dysfunction, unspecified erectile dysfunction type- increase tadalafil to 10mg. Notify office if response remains inadequate. Verbalized understanding of instructions and agreeable to this plan of care.        Dyslipidemia- continue with lifestyle management. Will check annually.      Screening for colon cancer- FIT testing ordered.         No orders of the defined types were placed in this encounter.      Meds & Refills for this Visit:  Requested Prescriptions      No prescriptions requested or ordered in this encounter       Imaging & Consults:  None    No follow-ups on file.  There are no Patient Instructions on file for this visit.

## 2024-04-04 NOTE — TELEPHONE ENCOUNTER
From: Shar Romero  To: Ed Vazquez  Sent: 4/4/2024 10:48 AM CDT  Subject: Lab results    I dont see my lab results in mychart. Can these be sent to my chart?

## 2024-04-16 ENCOUNTER — LAB REQUISITION (OUTPATIENT)
Dept: LAB | Facility: HOSPITAL | Age: 64
End: 2024-04-16
Payer: COMMERCIAL

## 2024-04-16 DIAGNOSIS — D48.5 NEOPLASM OF UNCERTAIN BEHAVIOR OF SKIN: ICD-10-CM

## 2024-04-16 PROCEDURE — 88305 TISSUE EXAM BY PATHOLOGIST: CPT | Performed by: STUDENT IN AN ORGANIZED HEALTH CARE EDUCATION/TRAINING PROGRAM

## 2024-04-21 LAB — HEMOCCULT STL QL: NEGATIVE

## 2024-06-17 ENCOUNTER — OFFICE VISIT (OUTPATIENT)
Dept: ORTHOPEDICS CLINIC | Facility: CLINIC | Age: 64
End: 2024-06-17
Payer: COMMERCIAL

## 2024-06-17 DIAGNOSIS — M17.12 PRIMARY OSTEOARTHRITIS OF LEFT KNEE: Primary | ICD-10-CM

## 2024-06-17 RX ORDER — MELOXICAM 15 MG/1
15 TABLET ORAL DAILY
Qty: 14 TABLET | Refills: 1 | Status: SHIPPED | OUTPATIENT
Start: 2024-06-17

## 2024-06-17 NOTE — PROGRESS NOTES
[facility] - ORTHOPEDICS  [address] [place] [address]  [phone]       Name: Shar Romero   MRN: [id number]  Date: 6/17/2024     REASON FOR VISIT: Follow up for left knee medial femoral condyle articular cartilage injury.    INTERVAL HISTORY:  Shar Romero is a 63 year old male who returns for evaluation of left knee medial femoral condyle articular cartilage injury. He followed up with Dr. Mccullough who provided a steroid injection in June 2023.  He had some symptomatic relief.  An MRI was performed in 2021 demonstrating osteochondral injury.    ROS: ROS    PE:   There were no vitals filed for this visit.  Estimated body mass index is 28.04 kg/m² as calculated from the following:    Height as of 4/4/24: 5' 7\" (1.702 m).    Weight as of 4/4/24: 179 lb (81.2 kg).    Physical Exam  Constitutional:       Appearance: Normal appearance.   HENT:      Head: Normocephalic and atraumatic.   Eyes:      Extraocular Movements: Extraocular movements intact.   Neck:      Musculoskeletal: Normal range of motion and neck supple.   Cardiovascular:      Pulses: Normal pulses.   Pulmonary:      Effort: Pulmonary effort is normal. No respiratory distress.   Abdominal:      General: There is no distension.   Skin:     General: Skin is warm.      Capillary Refill: Capillary refill takes less than 2 seconds.      Findings: No bruising.   Neurological:      General: No focal deficit present.      Mental Status: She is alert.   Psychiatric:         Mood and Affect: Mood normal.     Examination of the left knee demonstrates:     Skin is intact, warm and dry.   Atrophy: none    Effusion: none    Joint line tenderness: medial  Crepitation: none   Jeremy: Positive   Patellar mobility: normal without apprehension  J-sign: none    ROM: Extension full  Flexion 140 degrees  ACL:  Negative Lachman, Negative Pivot Shift   PCL:  Negative Posterior Drawer  Collateral Ligaments: Stable to Varus and  Valgus stress at 0 and 30 degrees  Strength: normal   Hip joint: normal pain-free ROM   Gait:  normal   Leg length: equal and symmetric  Alignment:  neutral     No obvious peripheral edema noted.   Distal neurovascular exam demonstrates normal perfusion, intact sensation to light touch and full strength.     Examination of the contralateral knee demonstrates:  No significant atrophy, swelling or effusion. Full range of motion. Neurovascularly intact distally.      Radiographic Examination/Diagnostics:    I personally viewed, independently interpreted and radiology report was reviewed.    No results found.    IMPRESSION: Shar Romero is a 63 year old male who presented for follow up of left knee cartilage injury/DJD.     PLAN:   We had a detailed discussion outlining the etiology, anatomy, pathophysiology, and natural history of the patient's findings.    We reviewed the treatment of this disease condition.  We discussed the etiology, pathophysiology, clinical manifestations and treatment of knee osteoarthritis. We discussed treatment including, but not limited to: weight loss, activity modification, RICE modalities, NSAIDs, steroid injections, viscosupplementation, and surgical intervention.     We will obtain authorization for left knee visco.     FOLLOW-UP:  Left knee visco.             Ho Lemus [facility], PA-C Orthopedic Surgery / Sports Medicine Specialist  [facility]  [address] [place] [address].org  [email or web address]  t: [phone]  o: [phone]  f: [phone]    This note was dictated using Dragon software.  While it was briefly proofread prior to completion, some grammatical, spelling, and word choice errors due to dictation may still occur.

## 2024-07-10 DIAGNOSIS — M17.12 PRIMARY OSTEOARTHRITIS OF LEFT KNEE: ICD-10-CM

## 2024-07-11 RX ORDER — MELOXICAM 15 MG/1
15 TABLET ORAL DAILY
Qty: 14 TABLET | Refills: 1 | Status: SHIPPED | OUTPATIENT
Start: 2024-07-11

## 2024-07-11 NOTE — TELEPHONE ENCOUNTER
Meloxicam    DOS: n/a  Last OV: 6/17/24  Last refill date: 6/29/24 #/refills: 14/0  Upcoming appt: none         Patient comment: I only have 4 tablets left on prescription. We are waiting for knee gel to come in and this is supposed to keep me comfortable until the injection. Can i get another refill please       Component  Ref Range & Units 4/1/24  9:14 AM   Total Protein  5.7 - 8.2 g/dL 7.3   Albumin  3.3 - 5.2 g/dL 4.9   Globulin  1.9 - 3.5 g/dL 2.4   Comment: Please note new reference range.   A/G Ratio  1.0 - 2.6 2.0   Total Bilirubin  0.1 - 1.2 mg/dL 1.0   Alkaline Phosphatase  46 - 116 U/L 64   AST  13 - 40 U/L 24   ALT  10 - 49 U/L 20   BUN  9 - 23 mg/dL 17   Creatinine  0.60 - 1.30 mg/dL 1.01   BUN/Creatinine Ratio  10.0 - 20.0 16.8   Calcium  8.7 - 10.4 mg/dL 9.7   Glucose  70 - 99 mg/dL 101 High    Sodium  136 - 145 mmol/L 140   Potassium  3.5 - 5.1 mmol/L 4.6   Chloride  98 - 107 mmol/L 107   CO2  21 - 32 mmol/L 27   Anion Gap  5 - 15 6   GFR  >60 mL/min/1.73 sq.m

## 2024-07-24 ENCOUNTER — TELEPHONE (OUTPATIENT)
Dept: ORTHOPEDICS CLINIC | Facility: CLINIC | Age: 64
End: 2024-07-24

## 2024-07-24 DIAGNOSIS — M17.12 PRIMARY OSTEOARTHRITIS OF LEFT KNEE: ICD-10-CM

## 2024-07-24 RX ORDER — MELOXICAM 15 MG/1
15 TABLET ORAL DAILY
Qty: 14 TABLET | Refills: 1 | Status: SHIPPED | OUTPATIENT
Start: 2024-07-24

## 2024-07-24 NOTE — TELEPHONE ENCOUNTER
Patient scheduled  Future Appointments   Date Time Provider Department Center   7/31/2024  1:00 PM [id number]   4/9/2025  9:00 AM Ed Vazquez NP [facility]

## 2024-07-24 NOTE — TELEPHONE ENCOUNTER
Meloxicam    DOS: n/a  Last OV: 6/17/24  Last refill date: 7/11/24 #/refills: 14/0  Upcoming appt:   Future Appointments   Date Time Provider Department Center   7/31/2024  1:00 PM Javan Mccullough MD [id number]   4/9/2025  9:00 AM Ed Vazquez NP [facility]     Component  Ref Range & Units 4/1/24  9:14 AM   Total Protein  5.7 - 8.2 g/dL 7.3   Albumin  3.3 - 5.2 g/dL 4.9   Globulin  1.9 - 3.5 g/dL 2.4   Comment: Please note new reference range.   A/G Ratio  1.0 - 2.6 2.0   Total Bilirubin  0.1 - 1.2 mg/dL 1.0   Alkaline Phosphatase  46 - 116 U/L 64   AST  13 - 40 U/L 24   ALT  10 - 49 U/L 20   BUN  9 - 23 mg/dL 17   Creatinine  0.60 - 1.30 mg/dL 1.01   BUN/Creatinine Ratio  10.0 - 20.0 16.

## 2024-07-25 NOTE — TELEPHONE ENCOUNTER
Patient authorized visco to be scheduled:    DOS: 7/31/2024  PROVIDER: NAZARIO  MEDICATION: JOHANN  OFFICE LOCATION: [facility]  PULLED: 7/25/2024  LABELED: 7/25/2024  PLACED: 7/25/2024

## 2024-07-31 ENCOUNTER — OFFICE VISIT (OUTPATIENT)
Dept: ORTHOPEDICS CLINIC | Facility: CLINIC | Age: 64
End: 2024-07-31
Payer: COMMERCIAL

## 2024-07-31 DIAGNOSIS — M17.12 PRIMARY OSTEOARTHRITIS OF LEFT KNEE: Primary | ICD-10-CM

## 2024-07-31 NOTE — PROCEDURES
Left Knee Intra-articular Injection    Name: Shar Romero   MRN: [id number]  Date: 7/31/2024     Clinical Indications:   Knee Osteoarthritis with symptoms refractory to conservative measures.     After informed consent, the injection site was marked, sterilized with topical chlorhexidine antiseptic, and locally anesthetized with skin refrigerant.    The patient was situation in a comfortable position. Using sterile technique: 4 ml of Durolane was injected utilizing anterolateral approach with a 22 gauge needle.  A band-aid was applied.  The patient tolerated the procedure well.        Javan Mccullough MD  Knee, Shoulder, & Elbow Surgery / Sports Medicine Specialist  Orthopaedic Surgery  [address] [place] [address] [address] [place] [address].org  [email or web address]  t: [phone]  o: [phone]  f: [phone]

## 2024-11-09 ENCOUNTER — PATIENT MESSAGE (OUTPATIENT)
Dept: FAMILY MEDICINE CLINIC | Facility: CLINIC | Age: 64
End: 2024-11-09

## 2025-01-06 ENCOUNTER — LAB ENCOUNTER (OUTPATIENT)
Dept: LAB | Age: 65
End: 2025-01-06
Attending: NURSE PRACTITIONER
Payer: COMMERCIAL

## 2025-01-06 DIAGNOSIS — Z13.29 SCREENING FOR THYROID DISORDER: ICD-10-CM

## 2025-01-06 DIAGNOSIS — Z13.220 SCREENING FOR LIPID DISORDERS: ICD-10-CM

## 2025-01-06 DIAGNOSIS — Z13.0 SCREENING FOR BLOOD DISEASE: ICD-10-CM

## 2025-01-06 DIAGNOSIS — Z12.5 SCREENING FOR MALIGNANT NEOPLASM OF PROSTATE: ICD-10-CM

## 2025-01-06 DIAGNOSIS — Z13.228 SCREENING FOR METABOLIC DISORDER: ICD-10-CM

## 2025-01-06 LAB
ALBUMIN SERPL-MCNC: 4.8 G/DL (ref 3.2–4.8)
ALBUMIN/GLOB SERPL: 1.7 {RATIO} (ref 1–2)
ALP LIVER SERPL-CCNC: 72 U/L
ALT SERPL-CCNC: 19 U/L
ANION GAP SERPL CALC-SCNC: 4 MMOL/L (ref 0–18)
AST SERPL-CCNC: 21 U/L (ref ?–34)
BASOPHILS # BLD AUTO: 0.05 X10(3) UL (ref 0–0.2)
BASOPHILS NFR BLD AUTO: 0.7 %
BILIRUB SERPL-MCNC: 0.8 MG/DL (ref 0.2–1.1)
BUN BLD-MCNC: 15 MG/DL (ref 9–23)
CALCIUM BLD-MCNC: 9.6 MG/DL (ref 8.7–10.4)
CHLORIDE SERPL-SCNC: 106 MMOL/L (ref 98–112)
CHOLEST SERPL-MCNC: 220 MG/DL (ref ?–200)
CO2 SERPL-SCNC: 30 MMOL/L (ref 21–32)
COMPLEXED PSA SERPL-MCNC: 1.53 NG/ML (ref ?–4)
CREAT BLD-MCNC: 0.97 MG/DL
EGFRCR SERPLBLD CKD-EPI 2021: 88 ML/MIN/1.73M2 (ref 60–?)
EOSINOPHIL # BLD AUTO: 0.25 X10(3) UL (ref 0–0.7)
EOSINOPHIL NFR BLD AUTO: 3.5 %
ERYTHROCYTE [DISTWIDTH] IN BLOOD BY AUTOMATED COUNT: 11.9 %
FASTING PATIENT LIPID ANSWER: YES
FASTING STATUS PATIENT QL REPORTED: YES
GLOBULIN PLAS-MCNC: 2.8 G/DL (ref 2–3.5)
GLUCOSE BLD-MCNC: 101 MG/DL (ref 70–99)
HCT VFR BLD AUTO: 44.5 %
HDLC SERPL-MCNC: 48 MG/DL (ref 40–59)
HGB BLD-MCNC: 15 G/DL
IMM GRANULOCYTES # BLD AUTO: 0.03 X10(3) UL (ref 0–1)
IMM GRANULOCYTES NFR BLD: 0.4 %
LDLC SERPL CALC-MCNC: 150 MG/DL (ref ?–100)
LYMPHOCYTES # BLD AUTO: 2.45 X10(3) UL (ref 1–4)
LYMPHOCYTES NFR BLD AUTO: 34.3 %
MCH RBC QN AUTO: 31.4 PG (ref 26–34)
MCHC RBC AUTO-ENTMCNC: 33.7 G/DL (ref 31–37)
MCV RBC AUTO: 93.1 FL
MONOCYTES # BLD AUTO: 0.64 X10(3) UL (ref 0.1–1)
MONOCYTES NFR BLD AUTO: 9 %
NEUTROPHILS # BLD AUTO: 3.72 X10 (3) UL (ref 1.5–7.7)
NEUTROPHILS # BLD AUTO: 3.72 X10(3) UL (ref 1.5–7.7)
NEUTROPHILS NFR BLD AUTO: 52.1 %
NONHDLC SERPL-MCNC: 172 MG/DL (ref ?–130)
OSMOLALITY SERPL CALC.SUM OF ELEC: 291 MOSM/KG (ref 275–295)
PLATELET # BLD AUTO: 251 10(3)UL (ref 150–450)
POTASSIUM SERPL-SCNC: 4.4 MMOL/L (ref 3.5–5.1)
PROT SERPL-MCNC: 7.6 G/DL (ref 5.7–8.2)
RBC # BLD AUTO: 4.78 X10(6)UL
SODIUM SERPL-SCNC: 140 MMOL/L (ref 136–145)
TRIGL SERPL-MCNC: 123 MG/DL (ref 30–149)
TSI SER-ACNC: 2.28 UIU/ML (ref 0.55–4.78)
VLDLC SERPL CALC-MCNC: 23 MG/DL (ref 0–30)
WBC # BLD AUTO: 7.1 X10(3) UL (ref 4–11)

## 2025-01-06 PROCEDURE — 36415 COLL VENOUS BLD VENIPUNCTURE: CPT

## 2025-01-06 PROCEDURE — 84443 ASSAY THYROID STIM HORMONE: CPT

## 2025-01-06 PROCEDURE — 80053 COMPREHEN METABOLIC PANEL: CPT

## 2025-01-06 PROCEDURE — 80061 LIPID PANEL: CPT

## 2025-01-06 PROCEDURE — 85025 COMPLETE CBC W/AUTO DIFF WBC: CPT

## 2025-01-07 ENCOUNTER — OFFICE VISIT (OUTPATIENT)
Dept: FAMILY MEDICINE CLINIC | Facility: CLINIC | Age: 65
End: 2025-01-07
Payer: COMMERCIAL

## 2025-01-07 ENCOUNTER — HOSPITAL ENCOUNTER (OUTPATIENT)
Dept: GENERAL RADIOLOGY | Age: 65
Discharge: HOME | End: 2025-01-07
Attending: NURSE PRACTITIONER
Payer: COMMERCIAL

## 2025-01-07 VITALS
OXYGEN SATURATION: 99 % | HEIGHT: 67 IN | HEART RATE: 74 BPM | BODY MASS INDEX: 28.25 KG/M2 | SYSTOLIC BLOOD PRESSURE: 106 MMHG | RESPIRATION RATE: 18 BRPM | DIASTOLIC BLOOD PRESSURE: 72 MMHG | WEIGHT: 180 LBS

## 2025-01-07 DIAGNOSIS — Z00.00 ROUTINE PHYSICAL EXAMINATION: Primary | ICD-10-CM

## 2025-01-07 DIAGNOSIS — R09.81 NASAL CONGESTION: ICD-10-CM

## 2025-01-07 DIAGNOSIS — Z12.11 SCREENING FOR COLON CANCER: ICD-10-CM

## 2025-01-07 DIAGNOSIS — M25.552 BILATERAL HIP PAIN: ICD-10-CM

## 2025-01-07 DIAGNOSIS — M25.551 BILATERAL HIP PAIN: ICD-10-CM

## 2025-01-07 DIAGNOSIS — E78.5 DYSLIPIDEMIA: ICD-10-CM

## 2025-01-07 DIAGNOSIS — N52.9 ERECTILE DYSFUNCTION, UNSPECIFIED ERECTILE DYSFUNCTION TYPE: ICD-10-CM

## 2025-01-07 PROCEDURE — 73523 X-RAY EXAM HIPS BI 5/> VIEWS: CPT | Performed by: NURSE PRACTITIONER

## 2025-01-07 PROCEDURE — 99396 PREV VISIT EST AGE 40-64: CPT | Performed by: NURSE PRACTITIONER

## 2025-01-07 PROCEDURE — 99213 OFFICE O/P EST LOW 20 MIN: CPT | Performed by: NURSE PRACTITIONER

## 2025-01-07 RX ORDER — AZELASTINE 1 MG/ML
1 SPRAY, METERED NASAL 2 TIMES DAILY
Qty: 30 ML | Refills: 0 | Status: SHIPPED | OUTPATIENT
Start: 2025-01-07

## 2025-01-07 RX ORDER — FLUTICASONE PROPIONATE 50 MCG
SPRAY, SUSPENSION (ML) NASAL
COMMUNITY
Start: 2024-10-08

## 2025-01-07 RX ORDER — TADALAFIL 10 MG/1
10 TABLET ORAL
Qty: 8 TABLET | Refills: 5 | Status: SHIPPED | OUTPATIENT
Start: 2025-01-07

## 2025-01-07 NOTE — PROGRESS NOTES
Shar Romero is a 64 year old male who presents for a complete physical exam.     HPI:   Pt complains of intermittent pain to bilateral anterior hip area. Reports pain started in April 2023 and was worse with golf type movements and coughing. Reports pain is currently significantly improved and now only present with coughing (has not been golfing recently during winter months). Denies specific injury to hips. Denies limited ROM to hips. Denies walking worsens pain. Has been managing with some exercises and stretching from family member who is orthopedic PA. Has also taken some ibuprofen which seems to temporarily help.     Reports approx 2 month history of persistent nasal congestion that started after URI type symptoms. Reports also has persistent nasal drainage, worse at night and in the mornings and reports right nare feels blocked all the time. Denies current fevers/chills, sore throat, ear pain/pressure,SOB. Has been treating with Flonase w/o relief.      Has prior diagnosis of ED, manages with tadalafil 10mg with adequate response. Denies weak/slow stream, urgency, frequency, hematuria. Has not treated with anything.       Again, noted with dyslipidemia on screening labs, Reports has been exercising, admits some room to improve diet. Denies chest pain, palpitations, SOB, ARREGUIN, headaches, dizziness, lightheadedness, visual changes.   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 Is  with 2 grown children, several grandchildren. Trying to eat healthy. Does not exercise routinely but planning to resume. Reports is non-smoker. Drinks  0-2 alcoholic drinks per week.     Colon cancer screening:  ordered as below    Wt Readings from Last 6 Encounters:   01/07/25 180 lb (81.6 kg)   04/04/24 179 lb (81.2 kg)   01/25/23 189 lb (85.7 kg)   03/14/22 185 lb (83.9 kg)   12/06/21 185 lb (83.9 kg)   09/17/21 185 lb (83.9 kg)       Cholesterol, Total (mg/dL)   Date Value   01/06/2025 220 (H)   04/06/2023 199   01/03/2023 226 (H)   02/27/2020 202     CHOLESTEROL (mg/dL)   Date Value   11/26/2012 186   11/29/2011 206 (H)     HDL Cholesterol (mg/dL)   Date Value   01/06/2025 48   04/06/2023 48   01/03/2023 57   02/27/2020 52     HDL CHOL (mg/dL)   Date Value   11/26/2012 48   11/29/2011 45     LDL Cholesterol (mg/dL)   Date Value   01/06/2025 150 (H)   04/06/2023 131 (H)   01/03/2023 145 (H)     LDL CHOLESTROL (mg/dL)   Date Value   11/26/2012 120   11/29/2011 139 (H)     AST (U/L)   Date Value   01/06/2025 21   01/03/2023 21   09/17/2021 30   11/26/2012 27   11/29/2011 17     ALT (U/L)   Date Value   01/06/2025 19   01/03/2023 27   09/17/2021 59   11/26/2012 25   11/29/2011 31      Current Outpatient Medications   Medication Sig Dispense Refill    fluticasone propionate 50 MCG/ACT Nasal Suspension INSTILL 1 SPRAY INTO EACH NOSTRIL 2 (TWO) TIMES A DAY FOR 14 DAYS      Tadalafil 10 MG Oral Tab Take 1 tablet (10 mg total) by mouth daily as needed for Erectile Dysfunction. 8 tablet 5    azelastine 0.1 % Nasal Solution 1 spray by Nasal route 2 (two) times daily. 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 Arevalo    Colonoscopy  2011?    Knee replacement surgery  8-10-21    Cleaned out cartiledge and repair slight tear in men    Tonsillectomy  01/01/1966    Vasectomy  1996?      Family History   Problem Relation Age of Onset    Cancer  Father         pancreatic, lung    Hypertension Father     Pulmonary Disease Mother         COPD    Stroke Mother     Diabetes Maternal Grandmother       Social History     Socioeconomic History    Marital status:    Tobacco Use    Smoking status: Never    Smokeless tobacco: Never   Vaping Use    Vaping status: Never Used   Substance and Sexual Activity    Alcohol use: Yes     Alcohol/week: 1.0 standard drink of alcohol     Types: 1 Standard drinks or equivalent per week     Comment: occasional    Drug use: Never   Other Topics Concern    Caffeine Concern Yes    Stress Concern No    Weight Concern No    Special Diet No    Exercise Yes    Seat Belt Yes      Social History: as above     Health Maintenance  Immunizations: Recommend flu vaccine for 2561-6166 flu season.     Immunization History   Administered Date(s) Administered    Covid-19 Vaccine Pfizer 30 mcg/0.3 ml 12/19/2020, 01/09/2021, 10/01/2021    Covid-19 Vaccine Pfizer Bivalent 30mcg/0.3mL 09/30/2022    Covid-19 Vaccine Pfizer Juan Carlos-Sucrose 30 mcg/0.3 ml 05/09/2022    FLULAVAL 6 months & older 0.5 ml Prefilled syringe (58772) 10/26/2022    FLUZONE 6 months and older PFS 0.5 ml (71105) 10/17/2023    Hep B, Unspecified Formulation 01/01/1994    Influenza 10/07/2019, 09/15/2020, 09/15/2021, 10/21/2024    MMR 11/17/2006, 10/07/2013    Pfizer Covid-19 Vaccine 30mcg/0.3ml 12yrs+ 10/17/2023, 10/08/2024    RSV, bivalent, diluent reconstituted PF (Abrysvo) 12/18/2023    TDAP 10/11/2021    Tetanus 01/01/2008    Varicella 11/20/2007    Zoster Vaccine Recombinant Adjuvanted (Shingrix) 08/26/2021, 11/02/2021         REVIEW OF SYSTEMS:   GENERAL: feels well otherwise. No fever, chills, malaise.  SKIN: denies any unusual skin lesions, rash or pruritis.  EYES: denies blurred/double vision, light sensitivity or visual disturbances.  HEENT: denies sinus pain/tenderness. Denies neck stiffness.  LUNGS: denies dyspnea, wheezing, SOB, ARREGUIN, cough.  CARDIOVASCULAR: denies  chest pain on exertion, palpitations, edema, orthopnea.  GI: denies abdominal pain, heartburn, diarrhea or constipation.  : denies dysuria, frequency, urgency, hematuria, changes in stream or nocturia.   MUSCULOSKELETAL: denies back pain.  NEURO: denies headaches, dizziness, syncope.    EXAM:   /72   Pulse 74   Resp 18   Ht 5' 7\" (1.702 m)   Wt 180 lb (81.6 kg)   SpO2 99%   BMI 28.19 kg/m²   Body mass index is 28.19 kg/m².   GENERAL: well developed, well nourished,in no apparent distress  SKIN: Skin warm/dry. Color appropriate for ethnicity. No rashes, suspicious lesions.  HEENT: atraumatic, normocephalic. Bilateral TM clear w/o erythema or bulge  EYES: PERRLA, EOMI. Conjunctiva are clear. Sclera white  NECK: supple w/o masses/tenderness/ adenopathy, no bruits/JVD, Thyroid symmetrical w/o enlargement  LUNGS: clear to auscultation bilaterally, effort normal. Symmetrical expansion during respirations.  CARDIO: RRR without murmurs. No S3/S4.   GI: Soft, non-tender/non-distended, BS(+)x4, no masses, HSM or CVA tenderness. No masses or TTP to bilateral inguinal areas.   MUSCULOSKELETAL: muscle strength grade 5 to all extremities, back non-tender. MS: Low back is non-TTP, without masses or obvious deformity. Bilateral SLR (-) to reproduce pain. To hips. Bilateral internal/external rotation (-) to reproduce pain tp hips.    EXTREMITIES: ROM bilateral legs WNL. Muscle strength bilat legs 5/5. No edema, erythema noted to bilateral legs.   NEURO: A/Ox3, cranial nerves II-XII intact, motor/sensory function grossly intact.     ASSESSMENT AND PLAN:     HEALTH MAINTENANCE:     Encounter Diagnoses   Name Primary?    Routine physical examination- adult male in his usual state of health. Discussed appropriate health guidance including importance of getting daily exercise and healthy diet choices. Labs reviewed in office today.   Yes    Bilateral hip pain- already improving. Check xrays as ordered. Referred to PT for  eval and treat. If not resolved with PT, see Dr. Goodman as referred. Verbalized understanding of instructions and agreeable to this plan of care.        Nasal congestion- stop Flonase. Start azelastine as ordered. If no improvement, see Dr. Thompson for further eval. Verbalized understanding of instructions and agreeable to this plan of care.        Dyslipidemia- discussed starting medications. Patient prefers to try lifestyle management first and this also reviewed. Repeat lipids in 3-6 months with lifestyle management. If not significantly improved patient will consider statin therapy.        Erectile dysfunction, unspecified erectile dysfunction type- Stable. Continue current management, refills provided.        Screening for colon cancer- FIT testing ordered, to be completed in April this year. Verbalized understanding of instructions and agreeable to this plan of care.           Orders Placed This Encounter   Procedures    Lipid Panel [E]    Occult Blood, Fecal, Immunoassay (Blue cards) [E]       Meds & Refills for this Visit:  Requested Prescriptions     Signed Prescriptions Disp Refills    Tadalafil 10 MG Oral Tab 8 tablet 5     Sig: Take 1 tablet (10 mg total) by mouth daily as needed for Erectile Dysfunction.    azelastine 0.1 % Nasal Solution 30 mL 0     Si spray by Nasal route 2 (two) times daily.       Imaging & Consults:  ORTHOPEDIC - INTERNAL  PHYSICAL THERAPY EXTERNAL  ENT - INTERNAL    No follow-ups on file.  Patient Instructions                   Stop Fluticasone.   Use Azelastine, one spray each nostril, morning and evening.

## 2025-01-07 NOTE — PATIENT INSTRUCTIONS
Stop Fluticasone.   Use Azelastine, one spray each nostril, morning and evening.

## 2025-04-09 ENCOUNTER — APPOINTMENT (OUTPATIENT)
Dept: LAB | Age: 65
End: 2025-04-09
Attending: NURSE PRACTITIONER
Payer: COMMERCIAL

## 2025-04-09 PROCEDURE — 82274 ASSAY TEST FOR BLOOD FECAL: CPT

## 2025-04-21 LAB — HEMOCCULT STL QL: NEGATIVE

## (undated) DIAGNOSIS — L82.1 OTHER SEBORRHEIC KERATOSIS: ICD-10-CM

## (undated) DIAGNOSIS — D48.5 NEOPLASM OF UNCERTAIN BEHAVIOR OF SKIN: ICD-10-CM

## (undated) DIAGNOSIS — C44.92 SQUAMOUS CELL CARCINOMA OF SKIN, UNSPECIFIED: ICD-10-CM

## (undated) DEVICE — SUTURE MONOCRYL 3-0 PS-2

## (undated) DEVICE — STANDARD HYPODERMIC NEEDLE,POLYPROPYLENE HUB: Brand: MONOJECT

## (undated) DEVICE — SCD SLEEVE KNEE HI BLEND

## (undated) DEVICE — ALCOHOL 70% 4 OZ

## (undated) DEVICE — 1010 S-DRAPE TOWEL DRAPE 10/BX: Brand: STERI-DRAPE™

## (undated) DEVICE — GAUZE SPONGES,12 PLY: Brand: CURITY

## (undated) DEVICE — 3M™ STERI-STRIP™ REINFORCED ADHESIVE SKIN CLOSURES, R1547, 1/2 IN X 4 IN (12 MM X 100 MM), 6 STRIPS/ENVELOPE: Brand: 3M™ STERI-STRIP™

## (undated) DEVICE — 3M™ TEGADERM™ +PAD FILM DRESSING WITH NON-ADHERENT PAD, 3584, 2-3/8 IN X 4 IN (6 CM X 10 CM), 50/CAR, 4 CAR/CS: Brand: 3M™ TEGADERM™

## (undated) DEVICE — 3M™ IOBAN™ 2 ANTIMICROBIAL INCISE DRAPE 6650EZ: Brand: IOBAN™ 2

## (undated) DEVICE — STERILE POLYISOPRENE POWDER-FREE SURGICAL GLOVES WITH EMOLLIENT COATING: Brand: PROTEXIS

## (undated) DEVICE — EXOFIN TISSUE ADHESIVE 1.0ML

## (undated) DEVICE — #15 STERILE STAINLESS BLADE: Brand: STERILE STAINLESS BLADES

## (undated) DEVICE — KNEE ARTHROSCOPY CDS: Brand: MEDLINE INDUSTRIES, INC.

## (undated) DEVICE — SUPER TURBOVAC 90 IFS: Brand: COBLATION

## (undated) DEVICE — STERILE POLYISOPRENE POWDER-FREE SURGICAL GLOVES: Brand: PROTEXIS

## (undated) DEVICE — COVER,MAYO STAND,STERILE: Brand: MEDLINE

## (undated) DEVICE — HANDLE LIGHT ECONOMY

## (undated) DEVICE — 3M™ TEGADERM™ +PAD FILM DRESSING WITH NON-ADHERENT PAD, 3587, 3-1/2 IN X 4-1/8 IN (9 CM X 10.5 CM), 25/CAR, 4 CAR/CS: Brand: 3M™ TEGADERM™

## (undated) DEVICE — TUBING IRR 16FT CNT WV 3 ASCP

## (undated) DEVICE — SOL  .9 3000ML

## (undated) DEVICE — AGGRESSIVE PLUS, ANGLED CUTTER: Brand: FORMULA

## (undated) DEVICE — DRAPE HALF 40X58 DYNJP2410

## (undated) DEVICE — Device: Brand: BIOCUE® CONCENTRATION SYSTEM

## (undated) DEVICE — TUBING DW OUTFLOW

## (undated) DEVICE — DERMABOND LIQUID ADHESIVE

## (undated) DEVICE — MEDI-VAC NON-CONDUCTIVE SUCTION TUBING: Brand: CARDINAL HEALTH

## (undated) NOTE — MR AVS SNAPSHOT
[address] [facility] [address]  [facility],  [address]  [address] [place] [address] [phone]               Thank you for choosing us for your health care visit with Yusef Ling MD.  We are glad to serve you and happy to provide you with this s Choose whole grain products Foods high in sodium   Water is best for hydration Fast food.    Eat at home when possible     Tips for increasing your physical activity – Adults who are physically active are less likely to develop some chronic diseases than ad

## (undated) NOTE — LETTER
Patient Name: Anali Smalls  YOB: 1960          MRN number:  [id number]  Date:  7/28/2020  Referring Physician:  Steven Pinzon MD     KNEE EVALUATION:    Referring Physician: Steven Pinzon MD    DX Code: [id number] Additional Information / Findings:  -  Today’s Treatment:  LAQ, single leg bridge, single leg sit to stand, SLR, SLR with ER, prone hip ext, prone quad stretch. HEP: Handouts given  Pt.  Education: Activity modification: start HEP 2-6x/day  (Please close n · Pt will be able to perform work related tasks with no pain. · Pt. will be independent with home exercise program and self management. Patient will be seen 2 x /week for 4 weeks or a total of 8 visits.    Treatment will include:  · Manual therapy to a

## (undated) NOTE — LETTER
Patient Name: Silvia Weeks  YOB: 1960          MRN :  [id number]  Date:  9/10/2021  Referring Physician:  Mayo Horton    Progress Summary  Pt has attended 5 visits in Physical Therapy.      Dx: L Osteochondral lesion (M89.9,M9 grossly 4+/5 to be able to get up and down from the floor safely  · Pt will demonstrate increased hip ER/ABD strength to 4+/5 to perform stepping and squatting activities without excessive femoral IR/ADD  · Pt will improve SLS to >10s to improve safety and